# Patient Record
Sex: MALE | Race: BLACK OR AFRICAN AMERICAN | NOT HISPANIC OR LATINO | Employment: FULL TIME | URBAN - METROPOLITAN AREA
[De-identification: names, ages, dates, MRNs, and addresses within clinical notes are randomized per-mention and may not be internally consistent; named-entity substitution may affect disease eponyms.]

---

## 2018-08-23 ENCOUNTER — CLINICAL SUPPORT (OUTPATIENT)
Dept: OCCUPATIONAL MEDICINE | Facility: CLINIC | Age: 38
End: 2018-08-23

## 2018-08-23 DIAGNOSIS — Z02.89 ENCOUNTER FOR PHYSICAL EXAMINATION RELATED TO EMPLOYMENT: Primary | ICD-10-CM

## 2018-08-23 PROCEDURE — 86580 TB INTRADERMAL TEST: CPT | Mod: S$GLB,,, | Performed by: FAMILY MEDICINE

## 2018-08-26 LAB
TB INDURATION - 48 HR READ: 0 MM
TB INDURATION - 72 HR READ: NORMAL MM
TB SKIN TEST - 48 HR READ: NEGATIVE
TB SKIN TEST - 72 HR READ: NORMAL

## 2018-08-27 ENCOUNTER — CLINICAL SUPPORT (OUTPATIENT)
Dept: OCCUPATIONAL MEDICINE | Facility: CLINIC | Age: 38
End: 2018-08-27

## 2018-08-27 DIAGNOSIS — Z23 ENCOUNTER FOR VACCINATION: Primary | ICD-10-CM

## 2018-08-27 PROCEDURE — 90707 MMR VACCINE SC: CPT | Mod: S$GLB,,, | Performed by: FAMILY MEDICINE

## 2018-09-23 ENCOUNTER — HOSPITAL ENCOUNTER (EMERGENCY)
Facility: HOSPITAL | Age: 38
Discharge: HOME | End: 2018-09-23
Attending: INTERNAL MEDICINE

## 2018-09-23 VITALS
RESPIRATION RATE: 17 BRPM | OXYGEN SATURATION: 99 % | WEIGHT: 225 LBS | HEART RATE: 71 BPM | HEIGHT: 70 IN | SYSTOLIC BLOOD PRESSURE: 145 MMHG | TEMPERATURE: 99 F | DIASTOLIC BLOOD PRESSURE: 84 MMHG | BODY MASS INDEX: 32.21 KG/M2

## 2018-09-23 DIAGNOSIS — L03.90 CELLULITIS, UNSPECIFIED CELLULITIS SITE: ICD-10-CM

## 2018-09-23 DIAGNOSIS — H60.11 CELLULITIS OF RIGHT EARLOBE: Primary | ICD-10-CM

## 2018-09-23 PROCEDURE — 99283 EMERGENCY DEPT VISIT LOW MDM: CPT

## 2018-09-23 RX ORDER — DOXYCYCLINE 100 MG/1
100 CAPSULE ORAL 2 TIMES DAILY
Qty: 20 CAPSULE | Refills: 0 | Status: SHIPPED | OUTPATIENT
Start: 2018-09-23 | End: 2018-10-03

## 2018-09-24 NOTE — ED PROVIDER NOTES
Encounter Date: 9/23/2018       History     Chief Complaint   Patient presents with    Otalgia     pt has a red raised lump on the right ear lobe. Noticed a small bump at the beginning of the week and overnight it got bigger.     38-year-old male presents to the emergency department complaining of red bump to right earlobe x1 week.  He denies fever/chills, nausea/vomiting.      The history is provided by the patient. No  was used.     Review of patient's allergies indicates:   Allergen Reactions    Pcn [penicillins] Anaphylaxis     History reviewed. No pertinent past medical history.  History reviewed. No pertinent surgical history.  History reviewed. No pertinent family history.  Social History     Tobacco Use    Smoking status: Current Every Day Smoker     Packs/day: 1.00     Types: Cigarettes   Substance Use Topics    Alcohol use: Yes     Comment: sometimes    Drug use: Yes     Types: Marijuana     Review of Systems   Skin: Positive for color change.   All other systems reviewed and are negative.      Physical Exam     Initial Vitals [09/23/18 1945]   BP Pulse Resp Temp SpO2   (!) 145/84 71 17 98.7 °F (37.1 °C) 99 %      MAP       --         Physical Exam    Nursing note and vitals reviewed.  Constitutional: He appears well-developed and well-nourished. No distress.   HENT:   Head: Normocephalic and atraumatic.   Right Ear: External ear normal.   Left Ear: External ear normal.   Eyes: EOM are normal.   Neck: Normal range of motion.   Cardiovascular: Normal rate and regular rhythm.   Pulmonary/Chest: No respiratory distress.   Abdominal: Soft.   Musculoskeletal: Normal range of motion.   Neurological: He is alert.   Skin: Skin is warm and dry.   Right earlobe with an erythematous, papular, tender lesion without fluctuance   Psychiatric: He has a normal mood and affect.         ED Course   Procedures  Labs Reviewed - No data to display       Imaging Results    None          Medical Decision  Making:   Initial Assessment:   38-year-old male presents to the emergency department complaining of red bump to right earlobe x1 week.  He denies fever/chills, nausea/vomiting.  ED Management:  Patient was given instructions for cellulitis and a prescription for doxycycline.  He was advised to follow up with his primary care physician within the next 2 days for re-evaluation and to return to the emergency department if condition worsens.                      Clinical Impression:   The primary encounter diagnosis was Cellulitis of right earlobe. A diagnosis of Cellulitis, unspecified cellulitis site was also pertinent to this visit.      Disposition:   Disposition: Discharged  Condition: Stable                        Mitch Milan MD  09/23/18 2016

## 2020-02-19 ENCOUNTER — OCCUPATIONAL HEALTH (OUTPATIENT)
Dept: URGENT CARE | Facility: CLINIC | Age: 40
End: 2020-02-19

## 2020-02-19 DIAGNOSIS — Z02.83 ENCOUNTER FOR DRUG SCREENING: Primary | ICD-10-CM

## 2020-02-19 LAB
CTP QC/QA: YES
POC 5 PANEL DRUG SCREEN: NEGATIVE

## 2020-02-19 PROCEDURE — 80305 POCT RAPID DRUG SCREEN 5 PANEL: ICD-10-PCS | Mod: QW,S$GLB,, | Performed by: FAMILY MEDICINE

## 2020-02-19 PROCEDURE — 80305 DRUG TEST PRSMV DIR OPT OBS: CPT | Mod: QW,S$GLB,, | Performed by: FAMILY MEDICINE

## 2020-07-08 ENCOUNTER — OFFICE VISIT (OUTPATIENT)
Dept: URGENT CARE | Facility: CLINIC | Age: 40
End: 2020-07-08
Payer: MEDICAID

## 2020-07-08 VITALS
RESPIRATION RATE: 19 BRPM | OXYGEN SATURATION: 97 % | HEART RATE: 88 BPM | SYSTOLIC BLOOD PRESSURE: 135 MMHG | BODY MASS INDEX: 32.21 KG/M2 | HEIGHT: 70 IN | WEIGHT: 225 LBS | DIASTOLIC BLOOD PRESSURE: 79 MMHG | TEMPERATURE: 99 F

## 2020-07-08 DIAGNOSIS — K04.7 DENTAL ABSCESS: ICD-10-CM

## 2020-07-08 DIAGNOSIS — H60.01 ABSCESS OF RIGHT EARLOBE: Primary | ICD-10-CM

## 2020-07-08 PROCEDURE — 99203 PR OFFICE/OUTPT VISIT, NEW, LEVL III, 30-44 MIN: ICD-10-PCS | Mod: S$GLB,,, | Performed by: PHYSICIAN ASSISTANT

## 2020-07-08 PROCEDURE — 99203 OFFICE O/P NEW LOW 30 MIN: CPT | Mod: S$GLB,,, | Performed by: PHYSICIAN ASSISTANT

## 2020-07-08 RX ORDER — IBUPROFEN 600 MG/1
600 TABLET ORAL EVERY 6 HOURS PRN
Qty: 20 TABLET | Refills: 0 | Status: SHIPPED | OUTPATIENT
Start: 2020-07-08 | End: 2020-08-07

## 2020-07-08 RX ORDER — CLINDAMYCIN HYDROCHLORIDE 300 MG/1
300 CAPSULE ORAL 4 TIMES DAILY
Qty: 40 CAPSULE | Refills: 0 | Status: SHIPPED | OUTPATIENT
Start: 2020-07-08 | End: 2020-07-18

## 2020-07-08 NOTE — PATIENT INSTRUCTIONS
- Rest.  - Drink plenty of fluids.  - Take Tylenol and/or Ibuprofen as directed as needed for fever/pain.  Do not take more than the recommended dose.  - follow up with your PCP within the next 1-2 weeks as needed.  - You must understand that you have received an Urgent Care treatment only and that you may be released before all of your medical problems are known or treated.   - You, the patient, will arrange for follow up care as instructed.   - If your condition worsens or fails to improve we recommend that you receive another evaluation at the ER immediately or contact your PCP to discuss your concerns.   - You can call [phone] or [phone] to help schedule an appointment with the appropriate provider.      Abscess (Antibiotic Treatment Only)  An abscess (sometimes called a boil) happens when bacteria get trapped under the skin and start to grow. Pus forms inside the abscess as the body responds to the bacteria. An abscess can happen with an insect bite, ingrown hair, blocked oil gland, pimple, cyst, or puncture wound.  In the early stages, your wound may be red and tender. For this stage, you may get antibiotics. If the abscess does not get better with antibiotics, it will need to be drained with a small cut.  Home care  These tips will help you care for your abscess at home:  · Soak the wound in hot water or apply hot packs (small towel soaked in hot water) to the area for 20 minutes at a time. Do this 3 to 4 times a day.  · Do not cut, squeeze, or pop the boil yourself.  · Apply antibiotic cream or ointment to the skin 3 to 4 times a day, unless something else was prescribed. Some ointments include an antibiotic plus a pain reliever.  · If your doctor prescribed antibiotics, do not stop taking them until you have finished the medicine or the doctor tells you to stop.  · You may use an over-the-counter pain medicine to control pain, unless another pain medicine was prescribed. If you have chronic  liver or kidney disease or ever had a stomach ulcer or gastrointestinal bleeding, talk with your doctor before using these any of these.  Follow-up care  Follow up with your healthcare provider, or as advised. Check your wound each day for the signs of worsening infection listed below.  When to seek medical advice  Get prompt medical attention if any of these occur:  · An increase in redness or swelling  · Red streaks in the skin leading away from the abscess  · An increase in local pain or swelling  · Fever of 100.4ºF (38ºC) or higher, or as directed by your healthcare provider  · Pus or fluid coming from the abscess  · Boil returns after getting better  Date Last Reviewed: 9/1/2016  © 8549-8097 [employer]. [address] [place] [address], [place] [address]. All rights reserved. This information is not intended as a substitute for professional medical care. Always follow your healthcare professional's instructions.        Dental Abscess    An abscess is a pocket of pus at the tip of a tooth root in your jaw bone. It is caused by an infection at the root of the tooth. It can cause pain and swelling of the gum, cheek, or jaw. Pain may spread from the tooth to your ear or the area of your jaw on the same side. If the abscess isnt treated, it appears as a bubble or swelling on the gum near the tooth. The pressure that builds in this swelling is the source of the pain. More serious infections cause your face to swell.  An abscess can be caused by a crack in the tooth, a cavity, a gum infection, or a combination of these. Once the pulp of the tooth is exposed, bacteria can spread down the roots to the tip. If the bacteria are not stopped, they can damage the bone and soft tissue, and an abscess can form.  Home care  Follow these guidelines when caring for yourself at home:  · Avoid hot and cold foods and drinks. Your tooth may be sensitive to changes in temperature. Dont chew on the side of the infected  "tooth.  · If your tooth is chipped or cracked, or if there is a large open cavity, put oil of cloves directly on the tooth to relieve pain. You can buy oil of cloves at drugstores. Some pharmacies carry an over-the-counter "toothache kit." This contains a paste that you can put on the exposed tooth to make it less sensitive.  · Put a cold pack on your jaw over the sore area to help reduce pain.  · You may use over-the-counter medicine to ease pain, unless another medicine was prescribed. If you have chronic liver or kidney disease, talk with your healthcare provider before using acetaminophen or ibuprofen. Also talk with your provider if youve had a stomach ulcer or GI bleeding.  · An antibiotic will be prescribed. Take it until finished, even if you are feeling better after a few days.  Follow-up care  Follow up with your dentist or an oral surgeon, or as advised. Once an infection occurs in a tooth, it will continue to be a problem until the infection is drained. This is done through surgery or a root canal. Or you may need to have your tooth pulled.  Call 911  Call 911 if any of these occur:  · Unusual drowsiness  · Headache or stiff neck  · Weakness or fainting  · Difficulty swallowing, breathing, or opening your mouth  · Swollen eyelids  When to seek medical advice  Call your healthcare provider right away if any of these occur:  · Your face becomes more swollen or red  · Pain gets worse or spreads to your neck  · Fever of 100.4º F (38.0º C) or higher, or as directed by your healthcare provider  · Pus drains from the tooth  Date Last Reviewed: 10/1/2016  © 0079-0713 [employer]. [address] [place] [address], [place] [address]. All rights reserved. This information is not intended as a substitute for professional medical care. Always follow your healthcare professional's instructions.        "

## 2020-07-08 NOTE — PROGRESS NOTES
"Subjective:       Patient ID: Boaz Pedroza is a 39 y.o. male.    Vitals:  height is 5' 10" (1.778 m) and weight is 102.1 kg (225 lb). His temperature is 98.7 °F (37.1 °C). His blood pressure is 135/79 and his pulse is 88. His respiration is 19 and oxygen saturation is 97%.     Chief Complaint: Abscess and Dental Pain    Pt stated that he have been having dental pain on the left side of his mouth for about a week and a half.  He says he broke the tooth approximately 1 month ago but has not had a chance to see a dentist.  He also as an abscess on his right lower ear lobe that he noticed 3-4 days ago.  Patient states he has had something similar in the past that resolved with antibiotics.  He denies fever or chills.    Abscess  Chronicity:  NewProgression Since Onset: rapidly worsening  Location:  Face  Associated Symptoms: no fever, no chills  Pain Scale:  6/10  Treatments Tried:  Nothing  Relieved by:  Nothing  Worsened by:  Nothing  Dental Pain   This is a new problem. The current episode started 1 to 4 weeks ago. The problem occurs constantly. The problem has been unchanged. The pain is at a severity of 9/10. The pain is severe. Pertinent negatives include no fever. He has tried acetaminophen and NSAIDs for the symptoms. The treatment provided mild relief.       Constitution: Negative for chills, fatigue and fever.   HENT: Positive for dental problem. Negative for congestion and sore throat.    Neck: Negative for painful lymph nodes.   Cardiovascular: Negative for chest pain and leg swelling.   Eyes: Negative for double vision and blurred vision.   Respiratory: Negative for cough and shortness of breath.    Gastrointestinal: Negative for nausea, vomiting and diarrhea.   Genitourinary: Negative for dysuria, frequency and urgency.   Musculoskeletal: Negative for joint pain, joint swelling, muscle cramps and muscle ache.   Skin: Positive for abscess. Negative for color change, pale and rash.   Allergic/Immunologic: " Negative for seasonal allergies.   Neurological: Negative for dizziness, history of vertigo, light-headedness, passing out and headaches.   Hematologic/Lymphatic: Negative for swollen lymph nodes, easy bruising/bleeding and history of blood clots. Does not bruise/bleed easily.   Psychiatric/Behavioral: Negative for nervous/anxious, sleep disturbance and depression. The patient is not nervous/anxious.        Objective:      Physical Exam   Constitutional: He is oriented to person, place, and time. He appears well-developed. He is cooperative.  Non-toxic appearance. He does not appear ill. No distress.   HENT:   Head: Normocephalic and atraumatic.   Right Ear: Hearing, tympanic membrane and ear canal normal. There is swelling.   Left Ear: Hearing, tympanic membrane and ear canal normal.   Ears:    Nose: No mucosal edema, rhinorrhea or nasal deformity. No epistaxis. Right sinus exhibits no maxillary sinus tenderness and no frontal sinus tenderness. Left sinus exhibits no maxillary sinus tenderness and no frontal sinus tenderness.   Mouth/Throat: Uvula is midline and mucous membranes are normal. No trismus in the jaw. Dental abscesses and dental caries present. No uvula swelling. No posterior oropharyngeal edema or posterior oropharyngeal erythema.       Eyes: Conjunctivae and lids are normal. No scleral icterus.   Neck: Trachea normal, full passive range of motion without pain and phonation normal. Neck supple. No neck rigidity. No edema and no erythema present.   Cardiovascular: Normal rate, regular rhythm, normal heart sounds and normal pulses.   Pulmonary/Chest: Effort normal and breath sounds normal. No respiratory distress. He has no decreased breath sounds. He has no rhonchi.   Abdominal: Normal appearance.   Musculoskeletal: Normal range of motion.         General: No tenderness or deformity.   Neurological: He is alert and oriented to person, place, and time. He exhibits normal muscle tone. Coordination normal.    Skin: Skin is warm, dry, intact, not diaphoretic, not pale and no rash.   Psychiatric: His speech is normal and behavior is normal. Judgment and thought content normal.   Nursing note and vitals reviewed.        Assessment:       1. Abscess of right earlobe    2. Dental abscess        Plan:         Abscess of right earlobe  -     clindamycin (CLEOCIN) 300 MG capsule; Take 1 capsule (300 mg total) by mouth 4 (four) times daily. for 10 days  Dispense: 40 capsule; Refill: 0  -     ibuprofen (ADVIL,MOTRIN) 600 MG tablet; Take 1 tablet (600 mg total) by mouth every 6 (six) hours as needed.  Dispense: 20 tablet; Refill: 0    Dental abscess  -     clindamycin (CLEOCIN) 300 MG capsule; Take 1 capsule (300 mg total) by mouth 4 (four) times daily. for 10 days  Dispense: 40 capsule; Refill: 0  -     ibuprofen (ADVIL,MOTRIN) 600 MG tablet; Take 1 tablet (600 mg total) by mouth every 6 (six) hours as needed.  Dispense: 20 tablet; Refill: 0         Boaz was seen today for abscess and dental pain.    Diagnoses and all orders for this visit:    Abscess of right earlobe  -     clindamycin (CLEOCIN) 300 MG capsule; Take 1 capsule (300 mg total) by mouth 4 (four) times daily. for 10 days  -     ibuprofen (ADVIL,MOTRIN) 600 MG tablet; Take 1 tablet (600 mg total) by mouth every 6 (six) hours as needed.    Dental abscess  -     clindamycin (CLEOCIN) 300 MG capsule; Take 1 capsule (300 mg total) by mouth 4 (four) times daily. for 10 days  -     ibuprofen (ADVIL,MOTRIN) 600 MG tablet; Take 1 tablet (600 mg total) by mouth every 6 (six) hours as needed.      Patient Instructions   - Rest.  - Drink plenty of fluids.  - Take Tylenol and/or Ibuprofen as directed as needed for fever/pain.  Do not take more than the recommended dose.  - follow up with your PCP within the next 1-2 weeks as needed.  - You must understand that you have received an Urgent Care treatment only and that you may be released before all of your medical problems are  known or treated.   - You, the patient, will arrange for follow up care as instructed.   - If your condition worsens or fails to improve we recommend that you receive another evaluation at the ER immediately or contact your PCP to discuss your concerns.   - You can call [phone] or [phone] to help schedule an appointment with the appropriate provider.      Abscess (Antibiotic Treatment Only)  An abscess (sometimes called a boil) happens when bacteria get trapped under the skin and start to grow. Pus forms inside the abscess as the body responds to the bacteria. An abscess can happen with an insect bite, ingrown hair, blocked oil gland, pimple, cyst, or puncture wound.  In the early stages, your wound may be red and tender. For this stage, you may get antibiotics. If the abscess does not get better with antibiotics, it will need to be drained with a small cut.  Home care  These tips will help you care for your abscess at home:  · Soak the wound in hot water or apply hot packs (small towel soaked in hot water) to the area for 20 minutes at a time. Do this 3 to 4 times a day.  · Do not cut, squeeze, or pop the boil yourself.  · Apply antibiotic cream or ointment to the skin 3 to 4 times a day, unless something else was prescribed. Some ointments include an antibiotic plus a pain reliever.  · If your doctor prescribed antibiotics, do not stop taking them until you have finished the medicine or the doctor tells you to stop.  · You may use an over-the-counter pain medicine to control pain, unless another pain medicine was prescribed. If you have chronic liver or kidney disease or ever had a stomach ulcer or gastrointestinal bleeding, talk with your doctor before using these any of these.  Follow-up care  Follow up with your healthcare provider, or as advised. Check your wound each day for the signs of worsening infection listed below.  When to seek medical advice  Get prompt medical attention if any of  "these occur:  · An increase in redness or swelling  · Red streaks in the skin leading away from the abscess  · An increase in local pain or swelling  · Fever of 100.4ºF (38ºC) or higher, or as directed by your healthcare provider  · Pus or fluid coming from the abscess  · Boil returns after getting better  Date Last Reviewed: 9/1/2016  © 2789-6392 [employer]. [address] [place] [address], [place] [address]. All rights reserved. This information is not intended as a substitute for professional medical care. Always follow your healthcare professional's instructions.        Dental Abscess    An abscess is a pocket of pus at the tip of a tooth root in your jaw bone. It is caused by an infection at the root of the tooth. It can cause pain and swelling of the gum, cheek, or jaw. Pain may spread from the tooth to your ear or the area of your jaw on the same side. If the abscess isnt treated, it appears as a bubble or swelling on the gum near the tooth. The pressure that builds in this swelling is the source of the pain. More serious infections cause your face to swell.  An abscess can be caused by a crack in the tooth, a cavity, a gum infection, or a combination of these. Once the pulp of the tooth is exposed, bacteria can spread down the roots to the tip. If the bacteria are not stopped, they can damage the bone and soft tissue, and an abscess can form.  Home care  Follow these guidelines when caring for yourself at home:  · Avoid hot and cold foods and drinks. Your tooth may be sensitive to changes in temperature. Dont chew on the side of the infected tooth.  · If your tooth is chipped or cracked, or if there is a large open cavity, put oil of cloves directly on the tooth to relieve pain. You can buy oil of cloves at drugstores. Some pharmacies carry an over-the-counter "toothache kit." This contains a paste that you can put on the exposed tooth to make it less sensitive.  · Put a cold pack on your jaw over " the sore area to help reduce pain.  · You may use over-the-counter medicine to ease pain, unless another medicine was prescribed. If you have chronic liver or kidney disease, talk with your healthcare provider before using acetaminophen or ibuprofen. Also talk with your provider if youve had a stomach ulcer or GI bleeding.  · An antibiotic will be prescribed. Take it until finished, even if you are feeling better after a few days.  Follow-up care  Follow up with your dentist or an oral surgeon, or as advised. Once an infection occurs in a tooth, it will continue to be a problem until the infection is drained. This is done through surgery or a root canal. Or you may need to have your tooth pulled.  Call 911  Call 911 if any of these occur:  · Unusual drowsiness  · Headache or stiff neck  · Weakness or fainting  · Difficulty swallowing, breathing, or opening your mouth  · Swollen eyelids  When to seek medical advice  Call your healthcare provider right away if any of these occur:  · Your face becomes more swollen or red  · Pain gets worse or spreads to your neck  · Fever of 100.4º F (38.0º C) or higher, or as directed by your healthcare provider  · Pus drains from the tooth  Date Last Reviewed: 10/1/2016  © 1531-0621 [employer]. [address] [place] [address], [place] [address]. All rights reserved. This information is not intended as a substitute for professional medical care. Always follow your healthcare professional's instructions.

## 2021-06-04 ENCOUNTER — HOSPITAL ENCOUNTER (EMERGENCY)
Facility: HOSPITAL | Age: 41
Discharge: HOME | End: 2021-06-04
Attending: STUDENT IN AN ORGANIZED HEALTH CARE EDUCATION/TRAINING PROGRAM
Payer: MEDICAID

## 2021-06-04 VITALS
WEIGHT: 250 LBS | BODY MASS INDEX: 35.79 KG/M2 | HEART RATE: 80 BPM | OXYGEN SATURATION: 99 % | DIASTOLIC BLOOD PRESSURE: 80 MMHG | RESPIRATION RATE: 16 BRPM | TEMPERATURE: 99 F | SYSTOLIC BLOOD PRESSURE: 142 MMHG | HEIGHT: 70 IN

## 2021-06-04 DIAGNOSIS — R22.9 SKIN MASS: ICD-10-CM

## 2021-06-04 DIAGNOSIS — L02.91 ABSCESS: Primary | ICD-10-CM

## 2021-06-04 PROCEDURE — 10060 I&D ABSCESS SIMPLE/SINGLE: CPT | Mod: ER

## 2021-06-04 PROCEDURE — 99284 EMERGENCY DEPT VISIT MOD MDM: CPT | Mod: 25,ER

## 2021-06-04 PROCEDURE — 25000003 PHARM REV CODE 250: Mod: ER | Performed by: NURSE PRACTITIONER

## 2021-06-04 RX ORDER — HYDROCODONE BITARTRATE AND ACETAMINOPHEN 5; 325 MG/1; MG/1
1 TABLET ORAL
Status: COMPLETED | OUTPATIENT
Start: 2021-06-04 | End: 2021-06-04

## 2021-06-04 RX ORDER — SULFAMETHOXAZOLE AND TRIMETHOPRIM 800; 160 MG/1; MG/1
1 TABLET ORAL 2 TIMES DAILY
Qty: 14 TABLET | Refills: 0 | Status: ON HOLD | OUTPATIENT
Start: 2021-06-04 | End: 2024-01-28 | Stop reason: HOSPADM

## 2021-06-04 RX ORDER — SULFAMETHOXAZOLE AND TRIMETHOPRIM 800; 160 MG/1; MG/1
1 TABLET ORAL
Status: COMPLETED | OUTPATIENT
Start: 2021-06-04 | End: 2021-06-04

## 2021-06-04 RX ORDER — LIDOCAINE HYDROCHLORIDE 10 MG/ML
5 INJECTION INFILTRATION; PERINEURAL
Status: COMPLETED | OUTPATIENT
Start: 2021-06-04 | End: 2021-06-04

## 2021-06-04 RX ORDER — HYDROCODONE BITARTRATE AND ACETAMINOPHEN 5; 325 MG/1; MG/1
1 TABLET ORAL EVERY 6 HOURS PRN
Qty: 12 TABLET | Refills: 0 | Status: ON HOLD | OUTPATIENT
Start: 2021-06-04 | End: 2024-01-28 | Stop reason: HOSPADM

## 2021-06-04 RX ORDER — MUPIROCIN 20 MG/G
OINTMENT TOPICAL 3 TIMES DAILY
Qty: 15 G | Refills: 0 | Status: SHIPPED | OUTPATIENT
Start: 2021-06-04

## 2021-06-04 RX ADMIN — SULFAMETHOXAZOLE AND TRIMETHOPRIM 1 TABLET: 800; 160 TABLET ORAL at 05:06

## 2021-06-04 RX ADMIN — HYDROCODONE BITARTRATE AND ACETAMINOPHEN 1 TABLET: 5; 325 TABLET ORAL at 05:06

## 2021-06-04 RX ADMIN — LIDOCAINE HYDROCHLORIDE 5 ML: 10 INJECTION, SOLUTION INFILTRATION; PERINEURAL at 05:06

## 2022-02-17 ENCOUNTER — OFFICE VISIT (OUTPATIENT)
Dept: URGENT CARE | Facility: CLINIC | Age: 42
End: 2022-02-17
Payer: MEDICAID

## 2022-02-17 VITALS
WEIGHT: 250 LBS | HEIGHT: 70 IN | OXYGEN SATURATION: 97 % | RESPIRATION RATE: 18 BRPM | SYSTOLIC BLOOD PRESSURE: 152 MMHG | HEART RATE: 87 BPM | DIASTOLIC BLOOD PRESSURE: 74 MMHG | BODY MASS INDEX: 35.79 KG/M2 | TEMPERATURE: 98 F

## 2022-02-17 DIAGNOSIS — K04.7 DENTAL ABSCESS: Primary | ICD-10-CM

## 2022-02-17 PROCEDURE — 1159F MED LIST DOCD IN RCRD: CPT | Mod: CPTII,S$GLB,, | Performed by: PHYSICIAN ASSISTANT

## 2022-02-17 PROCEDURE — 3078F PR MOST RECENT DIASTOLIC BLOOD PRESSURE < 80 MM HG: ICD-10-PCS | Mod: CPTII,S$GLB,, | Performed by: PHYSICIAN ASSISTANT

## 2022-02-17 PROCEDURE — 3078F DIAST BP <80 MM HG: CPT | Mod: CPTII,S$GLB,, | Performed by: PHYSICIAN ASSISTANT

## 2022-02-17 PROCEDURE — 3077F SYST BP >= 140 MM HG: CPT | Mod: CPTII,S$GLB,, | Performed by: PHYSICIAN ASSISTANT

## 2022-02-17 PROCEDURE — 1160F RVW MEDS BY RX/DR IN RCRD: CPT | Mod: CPTII,S$GLB,, | Performed by: PHYSICIAN ASSISTANT

## 2022-02-17 PROCEDURE — 3008F PR BODY MASS INDEX (BMI) DOCUMENTED: ICD-10-PCS | Mod: CPTII,S$GLB,, | Performed by: PHYSICIAN ASSISTANT

## 2022-02-17 PROCEDURE — 99213 PR OFFICE/OUTPT VISIT, EST, LEVL III, 20-29 MIN: ICD-10-PCS | Mod: S$GLB,,, | Performed by: PHYSICIAN ASSISTANT

## 2022-02-17 PROCEDURE — 3077F PR MOST RECENT SYSTOLIC BLOOD PRESSURE >= 140 MM HG: ICD-10-PCS | Mod: CPTII,S$GLB,, | Performed by: PHYSICIAN ASSISTANT

## 2022-02-17 PROCEDURE — 1159F PR MEDICATION LIST DOCUMENTED IN MEDICAL RECORD: ICD-10-PCS | Mod: CPTII,S$GLB,, | Performed by: PHYSICIAN ASSISTANT

## 2022-02-17 PROCEDURE — 99213 OFFICE O/P EST LOW 20 MIN: CPT | Mod: S$GLB,,, | Performed by: PHYSICIAN ASSISTANT

## 2022-02-17 PROCEDURE — 3008F BODY MASS INDEX DOCD: CPT | Mod: CPTII,S$GLB,, | Performed by: PHYSICIAN ASSISTANT

## 2022-02-17 PROCEDURE — 1160F PR REVIEW ALL MEDS BY PRESCRIBER/CLIN PHARMACIST DOCUMENTED: ICD-10-PCS | Mod: CPTII,S$GLB,, | Performed by: PHYSICIAN ASSISTANT

## 2022-02-17 RX ORDER — NAPROXEN 500 MG/1
500 TABLET ORAL 2 TIMES DAILY WITH MEALS
Qty: 30 TABLET | Refills: 0 | Status: SHIPPED | OUTPATIENT
Start: 2022-02-17 | End: 2023-02-17

## 2022-02-17 RX ORDER — CLINDAMYCIN HYDROCHLORIDE 300 MG/1
300 CAPSULE ORAL 4 TIMES DAILY
Qty: 40 CAPSULE | Refills: 0 | Status: SHIPPED | OUTPATIENT
Start: 2022-02-17 | End: 2022-02-27

## 2022-02-17 NOTE — LETTER
February 17, 2022      [facility]  [address] [facility], [address]  [place] [address]  Phone: [phone]  Fax: [phone]       Patient: Boaz Pedroza   YOB: 1980  Date of Visit: 02/17/2022    To Whom It May Concern:    Rahat Pedroza  was at [facility] on 02/17/2022. The patient may return to work/school on 02/18/2022 with no restrictions. If you have any questions or concerns, or if I can be of further assistance, please do not hesitate to contact me.    Sincerely,         China Garber PA-C

## 2022-02-17 NOTE — PATIENT INSTRUCTIONS
- Rest.  - Drink plenty of fluids.  - Take Tylenol as directed as needed for fever/pain.  Do not take more than the recommended dose.  - follow up with your PCP within the next 1-2 weeks as needed.  - You must understand that you have received an Urgent Care treatment only and that you may be released before all of your medical problems are known or treated.   - You, the patient, will arrange for follow up care as instructed.   - If your condition worsens or fails to improve we recommend that you receive another evaluation at the ER immediately or contact your PCP to discuss your concerns.   - You can call [phone] or [phone] to help schedule an appointment with the appropriate provider.    Patient Education       Tooth Abscess Discharge Instructions   About this topic   A tooth abscess is a collection of pus from an infection. You may have had an injury to a tooth, unhealthy gums, or tooth decay that led to your abscess. You may need a root canal or to have your tooth pulled because of an abscess. You will need antibiotics to treat the infection. It is important to take all of your antibiotics even if you start to feel better. A tooth abscess can become very serious if it is not fully treated.     What care is needed at home?   · Ask your dentist what you need to do when you go home. Make sure you ask questions if you do not understand what the dentist says.  · Avoid very cold or very hot food and drinks. These can make pain worse.  · If you smoke, try to quit. Your doctor or nurse can help.  · You may want to take medicines like ibuprofen, naproxen, or acetaminophen to help with pain.  · Brush your teeth at least 2 times a day. Use toothpaste with fluoride.  · Use dental floss to clean between your teeth every day.  What follow-up care is needed?   · Your dentist may ask you to make visits to the office to check your progress. Be sure to keep these visits. You may need more treatment.  · You may need  to go see other experts if you have gum disease, need to have a root canal, or a tooth needs to be pulled.  What drugs may be needed?   Your dentist may order drugs to:  · Help with pain  · Prevent or fight an infection  Will physical activity be limited?   You may have to limit your activity for a short time if you have pain from the tooth abscess. Talk to your dentist about the right amount of activity for you.  What problems could happen?   · Infection may spread to other parts of your body  · Tooth loss  · Problems eating  · Bad breath  · Swelling  · Problem swallowing or breathing  · Pain  What can be done to prevent this health problem?   · See your dentist at least 2 times a year for a cleaning and check ups.  · Wear a mouth guard or headgear when playing sports.  When do I need to call the doctor?   · You have a fever of 100.4°F (38°C) or higher or chills.  · You have swelling or pain in your neck or throat.  · You are not able to open your mouth or eat.  · You have trouble breathing.  · You have very bad pain that is not helped by pain medicines.  · You have swelling in your gums or face.  · You have discharge around the tooth.  · You have a bad taste in your mouth.  · You have lots of bleeding from the gums.  · You have more pain after having a tooth pulled.  Teach Back: Helping You Understand   The Teach Back Method helps you understand the information we are giving you. After you talk with the staff, tell them in your own words what you learned. This helps to make sure the staff has described each thing clearly. It also helps to explain things that may have been confusing. Before going home, make sure you can do these:  · I can tell you about my condition.  · I can tell you how I should rinse my mouth.  · I can tell you what I will do if I have more tooth pain or swelling.  Where can I learn more?   [facility] Choices  http://www.nhs.[place]/Conditions/Dental-abscess/Pages/Introduction.aspx   Last Reviewed Date    2021-06-22  Consumer Information Use and Disclaimer   This information is not specific medical advice and does not replace information you receive from your health care provider. This is only a brief summary of general information. It does NOT include all information about conditions, illnesses, injuries, tests, procedures, treatments, therapies, discharge instructions or life-style choices that may apply to you. You must talk with your health care provider for complete information about your health and treatment options. This information should not be used to decide whether or not to accept your health care providers advice, instructions or recommendations. Only your health care provider has the knowledge and training to provide advice that is right for you.  Copyright   Copyright © 2021 [employer] and its affiliates and/or licensors. All rights reserved.  Patient Education       Dental Pain Discharge Instructions   About this topic   Tooth pain happens when the nerve in a tooth or the gum tissue around a tooth is irritated. You may have an intense, sharp pain or a mild, dull pain. The pain may be all the time or come and go. Sometimes, you may not know which tooth is causing your pain. You may think the pain is coming from more than one tooth. A fractured tooth, tooth decay, or gum disease are most often the cause of tooth pain. Tooth problems can cause pain in other areas of the head and neck. At times, tooth problems may also cause ear and jaw pain. Pain from a sinus infection may also feel like tooth pain.         What care is needed at home?   · Ask your dentist what you need to do when you go home. Make sure you ask questions if you do not understand what the dentist says. This way you will know what you need to do.  · Avoid very cold or very hot food or drinks. These may make pain worse.  What follow-up care is needed?   · Your dentist may ask you to make visits to the office to check on your  progress. Be sure to keep these visits.  · Your dentist may send you to an endodontist if you need a root canal.  · Your dentist may send you to a periodontist if you have gum disease.  · Your dentist may send you to an oral surgeon if you need a tooth or teeth removed.  What drugs may be needed?   The dentist may order drugs to:  · Help with pain and swelling  · Prevent or fight an infection  Will physical activity be limited?   Based on the cause of your tooth pain, you may have to limit your activity for a short time. Ask your dentist if you need to make changes to your daily routine.  What problems could happen?   If dental pain is not treated you may have:  · Infection that spreads to other parts of the body  · Gum disease that spreads to other parts of the mouth or body  · Tooth decay that gets worse and causes a loss of the tooth or nearby teeth  · Pain that gets worse or more intense  · Problems eating due to infection or pain  What can be done to prevent this health problem?   · Brush your teeth at least 2 times a day. Use toothpaste with fluoride.  · Use dental floss to clean between your teeth every day.  · Eat a well-balanced diet.  · Try to stay away from foods and drinks that are high in acid, sugar, and starch. These are things like chocolate, sweets, cakes, and fizzy drinks.  · See your dentist for regular cleaning and checkups.  · Wear a mouth guard or headgear when playing sports.  · If you are a smoker, stop smoking. Smoking can make some dental problems worse.  When do I need to call the doctor?   · Signs of infection. These include fever of 100.4° F (38°C) or higher; chills; swelling of the gums, neck, or face; discharge around a tooth; or bad taste in your mouth.  · Bad toothache or your mouth hurts a lot  · Pain that keeps you from sleeping  · Trouble swallowing, breathing, or chewing  · Trouble opening your mouth all the way  · Jaw pain with ear, chest, shoulder, or arm pain  · Lots of  bleeding from the gums  · Increased pain after a tooth is pulled. This may be a dry socket.  · Your condition gets worse or you have other concerns  Teach Back: Helping You Understand   The Teach Back Method helps you understand the information we are giving you. After you talk with the staff, tell them in your own words what you learned. This helps to make sure the staff has described each thing clearly. It also helps to explain things that may have been confusing. Before going home, make sure you can do these:  · I can tell you about my pain.  · I can tell you what may help ease my pain.  · I can tell you what I will do if I have trouble breathing, eating, or sleeping because of the pain.  Where can I learn more?   [facility] Choices  https://www.nhs.[place]/conditions/dental-abscess/   [facility] Choices  https://www.nhs.[place]/conditions/toothache/   Last Reviewed Date   2021-09-10  Consumer Information Use and Disclaimer   This information is not specific medical advice and does not replace information you receive from your health care provider. This is only a brief summary of general information. It does NOT include all information about conditions, illnesses, injuries, tests, procedures, treatments, therapies, discharge instructions or life-style choices that may apply to you. You must talk with your health care provider for complete information about your health and treatment options. This information should not be used to decide whether or not to accept your health care providers advice, instructions or recommendations. Only your health care provider has the knowledge and training to provide advice that is right for you.  Copyright   Copyright © 2021 [employer] and its affiliates and/or licensors. All rights reserved.

## 2022-02-17 NOTE — PROGRESS NOTES
"Subjective:       Patient ID: Boaz Pedroza is a 41 y.o. male.    Vitals:  height is 5' 10" (1.778 m) and weight is 113.4 kg (250 lb). His temperature is 97.6 °F (36.4 °C). His blood pressure is 152/74 (abnormal) and his pulse is 87. His respiration is 18 and oxygen saturation is 97%.     Chief Complaint: Dental Pain    Pt stated that he has had a tooth ache since yesterday.  Tooth is located on right upper jaw.    He says he thinks he has a hole in the tooth and that is cracked.  Pt stated that he thinks it may be infected and would like antibiotice . Pts pharmacy has been updated .     Dental Pain   This is a new problem. The current episode started yesterday. The problem occurs constantly. The problem has been unchanged. The pain is at a severity of 8/10. The pain is severe. Associated symptoms include facial pain. Pertinent negatives include no difficulty swallowing, fever, oral bleeding, sinus pressure or thermal sensitivity. He has tried NSAIDs for the symptoms. The treatment provided no relief.       Constitution: Negative for chills and fever.   HENT: Positive for dental problem. Negative for congestion and sinus pressure.    Cardiovascular: Negative for chest pain.   Respiratory: Negative for shortness of breath.    Musculoskeletal: Negative for pain and trauma.   Skin: Negative for color change.   Neurological: Negative for headaches and loss of consciousness.   Psychiatric/Behavioral: Negative for nervous/anxious. The patient is not nervous/anxious.        Objective:      Physical Exam   Constitutional: He is oriented to person, place, and time. He appears well-developed. He is cooperative.  Non-toxic appearance. He does not appear ill. No distress.   HENT:   Head: Normocephalic and atraumatic.   Ears:   Right Ear: Hearing normal.   Left Ear: Hearing normal.   Mouth/Throat: Dental abscesses and dental caries present.       Eyes: Conjunctivae and lids are normal. No scleral icterus.   Neck: Trachea normal " and phonation normal. Neck supple. No edema present. No erythema present. No neck rigidity present.   Cardiovascular: Normal rate, regular rhythm, normal heart sounds and normal pulses.   Pulmonary/Chest: Effort normal and breath sounds normal. No respiratory distress. He has no decreased breath sounds. He has no rhonchi.   Abdominal: Normal appearance.   Musculoskeletal: Normal range of motion.         General: No tenderness or deformity. Normal range of motion.   Neurological: He is alert and oriented to person, place, and time. He exhibits normal muscle tone. Coordination normal.   Skin: Skin is warm, dry, intact, not diaphoretic, not pale and no rash.   Psychiatric: His speech is normal and behavior is normal. Judgment and thought content normal.   Nursing note and vitals reviewed.        Assessment:       1. Dental abscess          Plan:         Dental abscess  -     clindamycin (CLEOCIN) 300 MG capsule; Take 1 capsule (300 mg total) by mouth 4 (four) times daily. for 10 days  Dispense: 40 capsule; Refill: 0  -     naproxen (NAPROSYN) 500 MG tablet; Take 1 tablet (500 mg total) by mouth 2 (two) times daily with meals.  Dispense: 30 tablet; Refill: 0                   Patient Instructions   - Rest.  - Drink plenty of fluids.  - Take Tylenol as directed as needed for fever/pain.  Do not take more than the recommended dose.  - follow up with your PCP within the next 1-2 weeks as needed.  - You must understand that you have received an Urgent Care treatment only and that you may be released before all of your medical problems are known or treated.   - You, the patient, will arrange for follow up care as instructed.   - If your condition worsens or fails to improve we recommend that you receive another evaluation at the ER immediately or contact your PCP to discuss your concerns.   - You can call [phone] or [phone] to help schedule an appointment with the appropriate provider.    Patient Education        Tooth Abscess Discharge Instructions   About this topic   A tooth abscess is a collection of pus from an infection. You may have had an injury to a tooth, unhealthy gums, or tooth decay that led to your abscess. You may need a root canal or to have your tooth pulled because of an abscess. You will need antibiotics to treat the infection. It is important to take all of your antibiotics even if you start to feel better. A tooth abscess can become very serious if it is not fully treated.     What care is needed at home?   · Ask your dentist what you need to do when you go home. Make sure you ask questions if you do not understand what the dentist says.  · Avoid very cold or very hot food and drinks. These can make pain worse.  · If you smoke, try to quit. Your doctor or nurse can help.  · You may want to take medicines like ibuprofen, naproxen, or acetaminophen to help with pain.  · Brush your teeth at least 2 times a day. Use toothpaste with fluoride.  · Use dental floss to clean between your teeth every day.  What follow-up care is needed?   · Your dentist may ask you to make visits to the office to check your progress. Be sure to keep these visits. You may need more treatment.  · You may need to go see other experts if you have gum disease, need to have a root canal, or a tooth needs to be pulled.  What drugs may be needed?   Your dentist may order drugs to:  · Help with pain  · Prevent or fight an infection  Will physical activity be limited?   You may have to limit your activity for a short time if you have pain from the tooth abscess. Talk to your dentist about the right amount of activity for you.  What problems could happen?   · Infection may spread to other parts of your body  · Tooth loss  · Problems eating  · Bad breath  · Swelling  · Problem swallowing or breathing  · Pain  What can be done to prevent this health problem?   · See your dentist at least 2 times a year for a cleaning and check ups.  · Wear a  mouth guard or headgear when playing sports.  When do I need to call the doctor?   · You have a fever of 100.4°F (38°C) or higher or chills.  · You have swelling or pain in your neck or throat.  · You are not able to open your mouth or eat.  · You have trouble breathing.  · You have very bad pain that is not helped by pain medicines.  · You have swelling in your gums or face.  · You have discharge around the tooth.  · You have a bad taste in your mouth.  · You have lots of bleeding from the gums.  · You have more pain after having a tooth pulled.  Teach Back: Helping You Understand   The Teach Back Method helps you understand the information we are giving you. After you talk with the staff, tell them in your own words what you learned. This helps to make sure the staff has described each thing clearly. It also helps to explain things that may have been confusing. Before going home, make sure you can do these:  · I can tell you about my condition.  · I can tell you how I should rinse my mouth.  · I can tell you what I will do if I have more tooth pain or swelling.  Where can I learn more?   [facility] Choices  http://www.nhs.[facility]/Conditions/Dental-abscess/Pages/Introduction.aspx   Last Reviewed Date   2021-06-22  Consumer Information Use and Disclaimer   This information is not specific medical advice and does not replace information you receive from your health care provider. This is only a brief summary of general information. It does NOT include all information about conditions, illnesses, injuries, tests, procedures, treatments, therapies, discharge instructions or life-style choices that may apply to you. You must talk with your health care provider for complete information about your health and treatment options. This information should not be used to decide whether or not to accept your health care providers advice, instructions or recommendations. Only your health care provider has the knowledge and training to  provide advice that is right for you.  Copyright   Copyright © 2021 [employer] and its affiliates and/or licensors. All rights reserved.  Patient Education       Dental Pain Discharge Instructions   About this topic   Tooth pain happens when the nerve in a tooth or the gum tissue around a tooth is irritated. You may have an intense, sharp pain or a mild, dull pain. The pain may be all the time or come and go. Sometimes, you may not know which tooth is causing your pain. You may think the pain is coming from more than one tooth. A fractured tooth, tooth decay, or gum disease are most often the cause of tooth pain. Tooth problems can cause pain in other areas of the head and neck. At times, tooth problems may also cause ear and jaw pain. Pain from a sinus infection may also feel like tooth pain.         What care is needed at home?   · Ask your dentist what you need to do when you go home. Make sure you ask questions if you do not understand what the dentist says. This way you will know what you need to do.  · Avoid very cold or very hot food or drinks. These may make pain worse.  What follow-up care is needed?   · Your dentist may ask you to make visits to the office to check on your progress. Be sure to keep these visits.  · Your dentist may send you to an endodontist if you need a root canal.  · Your dentist may send you to a periodontist if you have gum disease.  · Your dentist may send you to an oral surgeon if you need a tooth or teeth removed.  What drugs may be needed?   The dentist may order drugs to:  · Help with pain and swelling  · Prevent or fight an infection  Will physical activity be limited?   Based on the cause of your tooth pain, you may have to limit your activity for a short time. Ask your dentist if you need to make changes to your daily routine.  What problems could happen?   If dental pain is not treated you may have:  · Infection that spreads to other parts of the body  · Gum disease that  spreads to other parts of the mouth or body  · Tooth decay that gets worse and causes a loss of the tooth or nearby teeth  · Pain that gets worse or more intense  · Problems eating due to infection or pain  What can be done to prevent this health problem?   · Brush your teeth at least 2 times a day. Use toothpaste with fluoride.  · Use dental floss to clean between your teeth every day.  · Eat a well-balanced diet.  · Try to stay away from foods and drinks that are high in acid, sugar, and starch. These are things like chocolate, sweets, cakes, and fizzy drinks.  · See your dentist for regular cleaning and checkups.  · Wear a mouth guard or headgear when playing sports.  · If you are a smoker, stop smoking. Smoking can make some dental problems worse.  When do I need to call the doctor?   · Signs of infection. These include fever of 100.4° F (38°C) or higher; chills; swelling of the gums, neck, or face; discharge around a tooth; or bad taste in your mouth.  · Bad toothache or your mouth hurts a lot  · Pain that keeps you from sleeping  · Trouble swallowing, breathing, or chewing  · Trouble opening your mouth all the way  · Jaw pain with ear, chest, shoulder, or arm pain  · Lots of bleeding from the gums  · Increased pain after a tooth is pulled. This may be a dry socket.  · Your condition gets worse or you have other concerns  Teach Back: Helping You Understand   The Teach Back Method helps you understand the information we are giving you. After you talk with the staff, tell them in your own words what you learned. This helps to make sure the staff has described each thing clearly. It also helps to explain things that may have been confusing. Before going home, make sure you can do these:  · I can tell you about my pain.  · I can tell you what may help ease my pain.  · I can tell you what I will do if I have trouble breathing, eating, or sleeping because of the pain.  Where can I learn more?   [facility]  Choices  https://www.nhs.[place]/conditions/dental-abscess/   [facility] Choices  https://www.nhs.uk/conditions/toothache/   Last Reviewed Date   2021-09-10  Consumer Information Use and Disclaimer   This information is not specific medical advice and does not replace information you receive from your health care provider. This is only a brief summary of general information. It does NOT include all information about conditions, illnesses, injuries, tests, procedures, treatments, therapies, discharge instructions or life-style choices that may apply to you. You must talk with your health care provider for complete information about your health and treatment options. This information should not be used to decide whether or not to accept your health care providers advice, instructions or recommendations. Only your health care provider has the knowledge and training to provide advice that is right for you.  Copyright   Copyright © 2021 [employer] and its affiliates and/or licensors. All rights reserved.

## 2024-01-27 ENCOUNTER — HOSPITAL ENCOUNTER (OUTPATIENT)
Facility: HOSPITAL | Age: 44
Discharge: HOME | End: 2024-01-28
Attending: EMERGENCY MEDICINE | Admitting: HOSPITALIST

## 2024-01-27 DIAGNOSIS — E11.9 NEW ONSET TYPE 2 DIABETES MELLITUS: ICD-10-CM

## 2024-01-27 DIAGNOSIS — E11.10 DKA (DIABETIC KETOACIDOSES): ICD-10-CM

## 2024-01-27 DIAGNOSIS — E11.10 DKA (DIABETIC KETOACIDOSIS): ICD-10-CM

## 2024-01-27 DIAGNOSIS — E11.65 HYPERGLYCEMIA DUE TO DIABETES MELLITUS: Primary | ICD-10-CM

## 2024-01-27 DIAGNOSIS — R07.9 CHEST PAIN: ICD-10-CM

## 2024-01-27 DIAGNOSIS — I10 HYPERTENSION, UNSPECIFIED TYPE: ICD-10-CM

## 2024-01-27 PROBLEM — R03.0 ELEVATED BLOOD PRESSURE READING WITHOUT DIAGNOSIS OF HYPERTENSION: Status: ACTIVE | Noted: 2024-01-27

## 2024-01-27 PROBLEM — R73.9 HYPERGLYCEMIA: Status: ACTIVE | Noted: 2024-01-27

## 2024-01-27 LAB
ALBUMIN SERPL-MCNC: 4 G/DL (ref 3.3–5.5)
ALBUMIN SERPL-MCNC: 4.6 G/DL (ref 3.3–5.5)
ALLENS TEST: ABNORMAL
ALLENS TEST: ABNORMAL
ALP SERPL-CCNC: 67 U/L (ref 42–141)
ALP SERPL-CCNC: 86 U/L (ref 42–141)
BILIRUB SERPL-MCNC: 0.9 MG/DL (ref 0.2–1.6)
BILIRUB SERPL-MCNC: 0.9 MG/DL (ref 0.2–1.6)
BILIRUBIN, POC UA: NEGATIVE
BLOOD, POC UA: ABNORMAL
BUN SERPL-MCNC: 14 MG/DL (ref 7–22)
BUN SERPL-MCNC: 15 MG/DL (ref 7–22)
CALCIUM SERPL-MCNC: 11.1 MG/DL (ref 8–10.3)
CALCIUM SERPL-MCNC: 9.4 MG/DL (ref 8–10.3)
CHLORIDE SERPL-SCNC: 105 MMOL/L (ref 98–108)
CHLORIDE SERPL-SCNC: 99 MMOL/L (ref 98–108)
CLARITY, POC UA: CLEAR
COLOR, POC UA: YELLOW
CREAT SERPL-MCNC: 1 MG/DL (ref 0.6–1.2)
CREAT SERPL-MCNC: 1.3 MG/DL (ref 0.6–1.2)
GLUCOSE SERPL-MCNC: 327 MG/DL (ref 73–118)
GLUCOSE SERPL-MCNC: 462 MG/DL (ref 73–118)
GLUCOSE, POC UA: ABNORMAL
HCO3 UR-SCNC: 22.4 MMOL/L (ref 24–28)
HCO3 UR-SCNC: 27.2 MMOL/L (ref 24–28)
HCT, POC: NORMAL
HGB, POC: NORMAL (ref 14–18)
KETONES, POC UA: ABNORMAL
LDH SERPL L TO P-CCNC: 1.37 MMOL/L (ref 0.5–2.2)
LDH SERPL L TO P-CCNC: 2.44 MMOL/L (ref 0.5–2.2)
LEUKOCYTE EST, POC UA: NEGATIVE
MCH, POC: NORMAL
MCHC, POC: NORMAL
MCV, POC: NORMAL
MPV, POC: NORMAL
NITRITE, POC UA: NEGATIVE
PCO2 BLDA: 39.1 MMHG (ref 35–45)
PCO2 BLDA: 48.9 MMHG (ref 35–45)
PH SMN: 7.35 [PH] (ref 7.35–7.45)
PH SMN: 7.37 [PH] (ref 7.35–7.45)
PH UR STRIP: 5.5 [PH]
PO2 BLDA: 30 MMHG (ref 40–60)
PO2 BLDA: 56 MMHG (ref 40–60)
POC ALT (SGPT): 25 U/L (ref 10–47)
POC ALT (SGPT): 29 U/L (ref 10–47)
POC AST (SGOT): 19 U/L (ref 11–38)
POC AST (SGOT): 23 U/L (ref 11–38)
POC BE: -3 MMOL/L
POC BE: 1 MMOL/L
POC PLATELET COUNT: NORMAL
POC SATURATED O2: 53 % (ref 95–100)
POC SATURATED O2: 88 % (ref 95–100)
POC TCO2: 24 MMOL/L (ref 24–29)
POC TCO2: 25 MMOL/L (ref 18–33)
POC TCO2: 29 MMOL/L (ref 18–33)
POC TCO2: 29 MMOL/L (ref 24–29)
POCT GLUCOSE: 300 MG/DL (ref 70–110)
POCT GLUCOSE: 311 MG/DL (ref 70–110)
POCT GLUCOSE: 338 MG/DL (ref 70–110)
POCT GLUCOSE: 367 MG/DL (ref 70–110)
POCT GLUCOSE: 398 MG/DL (ref 70–110)
POCT GLUCOSE: 480 MG/DL (ref 70–110)
POTASSIUM BLD-SCNC: 4 MMOL/L (ref 3.6–5.1)
POTASSIUM BLD-SCNC: 4.4 MMOL/L (ref 3.6–5.1)
PROTEIN, POC UA: ABNORMAL
PROTEIN, POC: 7.4 G/DL (ref 6.4–8.1)
PROTEIN, POC: 8.6 G/DL (ref 6.4–8.1)
RBC, POC: NORMAL
RDW, POC: NORMAL
SAMPLE: ABNORMAL
SAMPLE: ABNORMAL
SITE: ABNORMAL
SITE: ABNORMAL
SODIUM BLD-SCNC: 140 MMOL/L (ref 128–145)
SODIUM BLD-SCNC: 140 MMOL/L (ref 128–145)
SPECIFIC GRAVITY, POC UA: 1.01
UROBILINOGEN, POC UA: 0.2 E.U./DL
WBC, POC: NORMAL

## 2024-01-27 PROCEDURE — G0378 HOSPITAL OBSERVATION PER HR: HCPCS

## 2024-01-27 PROCEDURE — 63600175 PHARM REV CODE 636 W HCPCS: Mod: ER | Performed by: EMERGENCY MEDICINE

## 2024-01-27 PROCEDURE — 96375 TX/PRO/DX INJ NEW DRUG ADDON: CPT | Mod: ER

## 2024-01-27 PROCEDURE — 96372 THER/PROPH/DIAG INJ SC/IM: CPT | Performed by: PHYSICIAN ASSISTANT

## 2024-01-27 PROCEDURE — 99291 CRITICAL CARE FIRST HOUR: CPT | Mod: ER

## 2024-01-27 PROCEDURE — 96372 THER/PROPH/DIAG INJ SC/IM: CPT | Mod: 59 | Performed by: PHYSICIAN ASSISTANT

## 2024-01-27 PROCEDURE — 83036 HEMOGLOBIN GLYCOSYLATED A1C: CPT | Performed by: EMERGENCY MEDICINE

## 2024-01-27 PROCEDURE — 96374 THER/PROPH/DIAG INJ IV PUSH: CPT | Mod: ER

## 2024-01-27 PROCEDURE — G0378 HOSPITAL OBSERVATION PER HR: HCPCS | Mod: ER

## 2024-01-27 PROCEDURE — 93010 ELECTROCARDIOGRAM REPORT: CPT | Mod: ,,, | Performed by: INTERNAL MEDICINE

## 2024-01-27 PROCEDURE — 96361 HYDRATE IV INFUSION ADD-ON: CPT | Mod: ER

## 2024-01-27 PROCEDURE — 93005 ELECTROCARDIOGRAM TRACING: CPT | Mod: ER

## 2024-01-27 PROCEDURE — 80053 COMPREHEN METABOLIC PANEL: CPT | Mod: ER

## 2024-01-27 PROCEDURE — 82962 GLUCOSE BLOOD TEST: CPT | Mod: ER

## 2024-01-27 PROCEDURE — 82803 BLOOD GASES ANY COMBINATION: CPT | Mod: ER

## 2024-01-27 PROCEDURE — 81003 URINALYSIS AUTO W/O SCOPE: CPT | Mod: ER

## 2024-01-27 PROCEDURE — 63600175 PHARM REV CODE 636 W HCPCS: Performed by: PHYSICIAN ASSISTANT

## 2024-01-27 PROCEDURE — 25000003 PHARM REV CODE 250: Mod: ER | Performed by: PHYSICIAN ASSISTANT

## 2024-01-27 PROCEDURE — 25000003 PHARM REV CODE 250: Performed by: PHYSICIAN ASSISTANT

## 2024-01-27 PROCEDURE — 25000003 PHARM REV CODE 250: Mod: ER | Performed by: EMERGENCY MEDICINE

## 2024-01-27 PROCEDURE — 25000003 PHARM REV CODE 250: Mod: ER | Performed by: NURSE PRACTITIONER

## 2024-01-27 PROCEDURE — 85025 COMPLETE CBC W/AUTO DIFF WBC: CPT | Mod: ER

## 2024-01-27 RX ORDER — IBUPROFEN 200 MG
16 TABLET ORAL
Status: DISCONTINUED | OUTPATIENT
Start: 2024-01-27 | End: 2024-01-28 | Stop reason: HOSPADM

## 2024-01-27 RX ORDER — NALOXONE HCL 0.4 MG/ML
0.02 VIAL (ML) INJECTION
Status: DISCONTINUED | OUTPATIENT
Start: 2024-01-27 | End: 2024-01-28 | Stop reason: HOSPADM

## 2024-01-27 RX ORDER — HYDRALAZINE HYDROCHLORIDE 25 MG/1
25 TABLET, FILM COATED ORAL EVERY 8 HOURS PRN
Status: DISCONTINUED | OUTPATIENT
Start: 2024-01-28 | End: 2024-01-28 | Stop reason: HOSPADM

## 2024-01-27 RX ORDER — SODIUM CHLORIDE, SODIUM LACTATE, POTASSIUM CHLORIDE, CALCIUM CHLORIDE 600; 310; 30; 20 MG/100ML; MG/100ML; MG/100ML; MG/100ML
INJECTION, SOLUTION INTRAVENOUS CONTINUOUS
Status: DISCONTINUED | OUTPATIENT
Start: 2024-01-28 | End: 2024-01-28

## 2024-01-27 RX ORDER — GLUCAGON 1 MG
1 KIT INJECTION
Status: DISCONTINUED | OUTPATIENT
Start: 2024-01-27 | End: 2024-01-28 | Stop reason: HOSPADM

## 2024-01-27 RX ORDER — SODIUM CHLORIDE 0.9 % (FLUSH) 0.9 %
10 SYRINGE (ML) INJECTION
Status: DISCONTINUED | OUTPATIENT
Start: 2024-01-27 | End: 2024-01-28 | Stop reason: HOSPADM

## 2024-01-27 RX ORDER — AMLODIPINE BESYLATE 5 MG/1
5 TABLET ORAL DAILY
Status: DISCONTINUED | OUTPATIENT
Start: 2024-01-28 | End: 2024-01-27

## 2024-01-27 RX ORDER — AMLODIPINE BESYLATE 5 MG/1
10 TABLET ORAL DAILY
Status: DISCONTINUED | OUTPATIENT
Start: 2024-01-27 | End: 2024-01-27

## 2024-01-27 RX ORDER — LANOLIN ALCOHOL/MO/W.PET/CERES
800 CREAM (GRAM) TOPICAL
Status: DISCONTINUED | OUTPATIENT
Start: 2024-01-27 | End: 2024-01-28 | Stop reason: HOSPADM

## 2024-01-27 RX ORDER — HYDRALAZINE HYDROCHLORIDE 20 MG/ML
5 INJECTION INTRAMUSCULAR; INTRAVENOUS
Status: COMPLETED | OUTPATIENT
Start: 2024-01-27 | End: 2024-01-27

## 2024-01-27 RX ORDER — SODIUM CHLORIDE 0.9 % (FLUSH) 0.9 %
10 SYRINGE (ML) INJECTION EVERY 8 HOURS
Status: DISCONTINUED | OUTPATIENT
Start: 2024-01-27 | End: 2024-01-27

## 2024-01-27 RX ORDER — TALC
6 POWDER (GRAM) TOPICAL NIGHTLY PRN
Status: DISCONTINUED | OUTPATIENT
Start: 2024-01-27 | End: 2024-01-28 | Stop reason: HOSPADM

## 2024-01-27 RX ORDER — AMLODIPINE BESYLATE 5 MG/1
10 TABLET ORAL DAILY
Status: DISCONTINUED | OUTPATIENT
Start: 2024-01-27 | End: 2024-01-28

## 2024-01-27 RX ORDER — INSULIN ASPART 100 [IU]/ML
0-5 INJECTION, SOLUTION INTRAVENOUS; SUBCUTANEOUS
Status: DISCONTINUED | OUTPATIENT
Start: 2024-01-27 | End: 2024-01-28 | Stop reason: HOSPADM

## 2024-01-27 RX ORDER — ACETAMINOPHEN 325 MG/1
650 TABLET ORAL EVERY 4 HOURS PRN
Status: DISCONTINUED | OUTPATIENT
Start: 2024-01-27 | End: 2024-01-28 | Stop reason: HOSPADM

## 2024-01-27 RX ORDER — IBUPROFEN 200 MG
24 TABLET ORAL
Status: DISCONTINUED | OUTPATIENT
Start: 2024-01-27 | End: 2024-01-28 | Stop reason: HOSPADM

## 2024-01-27 RX ORDER — LOSARTAN POTASSIUM 25 MG/1
25 TABLET ORAL DAILY
Status: DISCONTINUED | OUTPATIENT
Start: 2024-01-28 | End: 2024-01-28 | Stop reason: HOSPADM

## 2024-01-27 RX ORDER — AMOXICILLIN 250 MG
1 CAPSULE ORAL DAILY PRN
Status: DISCONTINUED | OUTPATIENT
Start: 2024-01-27 | End: 2024-01-28 | Stop reason: HOSPADM

## 2024-01-27 RX ADMIN — AMLODIPINE BESYLATE 10 MG: 5 TABLET ORAL at 11:01

## 2024-01-27 RX ADMIN — INSULIN HUMAN 6 UNITS: 100 INJECTION, SOLUTION PARENTERAL at 03:01

## 2024-01-27 RX ADMIN — HYDRALAZINE HYDROCHLORIDE 5 MG: 20 INJECTION INTRAMUSCULAR; INTRAVENOUS at 05:01

## 2024-01-27 RX ADMIN — SODIUM CHLORIDE, POTASSIUM CHLORIDE, SODIUM LACTATE AND CALCIUM CHLORIDE: 600; 310; 30; 20 INJECTION, SOLUTION INTRAVENOUS at 11:01

## 2024-01-27 RX ADMIN — INSULIN DETEMIR 8 UNITS: 100 INJECTION, SOLUTION SUBCUTANEOUS at 09:01

## 2024-01-27 RX ADMIN — SODIUM CHLORIDE 1000 ML: 9 INJECTION, SOLUTION INTRAVENOUS at 03:01

## 2024-01-27 RX ADMIN — INSULIN ASPART 1 UNITS: 100 INJECTION, SOLUTION INTRAVENOUS; SUBCUTANEOUS at 11:01

## 2024-01-27 RX ADMIN — SODIUM CHLORIDE 1000 ML: 9 INJECTION, SOLUTION INTRAVENOUS at 02:01

## 2024-01-27 NOTE — ADMISSIONCARE
AdmissionCare    Guideline: Diabetes - OBS, Observation    Based on the indications selected for the patient, the bed status of Admit to Observation was determined to be MET    The following indications were selected as present at the time of evaluation of the patient:      Hyperglycemia that requires care beyond emergency department treatment time frame (eg, 3 to 4 hours), as indicated by 1 or more of the following:   -     - Dehydration    - Electrolyte abnormality    - Ability to maintain oral hydration unclear    - Glucose level persistently too high for next level of care (eg, concern for redevelopment of dehydration, electrolyte abnormality), or not sufficiently stable    AdmissionCare documentation entered by: Melisa Hanks    [facility] [employer], 27th edition, Copyright © 2023 [facility] [employer], [facility] All Rights Reserved.  [id number]

## 2024-01-27 NOTE — ASSESSMENT & PLAN NOTE
Presents within initial BP of 189/100. Denies history of HTN.   Will start on amlodipine concern for new onset HTN with associated DM

## 2024-01-27 NOTE — ASSESSMENT & PLAN NOTE
Presents with polyuria and polydipsia and found to have BG of 480 on arrival. Concern for new onset DM  Will start 8 units basal sliding scale insulin, diabetic diet  Check A1c  DM teaching prior to discharge  Check lipids  Continue IVF given continued tachycardia

## 2024-01-27 NOTE — ED PROVIDER NOTES
Encounter Date: 1/27/2024    SCRIBE #1 NOTE: I, Bonnie Cunningham, am scribing for, and in the presence of,  Georgiana Hernandez MD. I have scribed the following portions of the note - Other sections scribed: HPI, ROS, PE.     History     Chief Complaint   Patient presents with    Dehydration    Eye Problem    Fatigue     A 42 y/o male presents to the ER c/o fatigue, visual disturbance, dehydration, dry mouth, and frequent urination x 1 month. Pt report family has Hx of DMII.      Boaz Pedroza is a 43 y.o. male, with no past medical history, who presents to the ED with dry mouth that began 2 weeks ago. Patient reports associated increased thirst, blurry vision, urinary frequency, and fatigue. Patient denies any associated syncope. Patient notes family history of DMII. Patient has allergy to penicillins. Patient admits to smoking tobacco and occasional marijuana usage.       The history is provided by the patient. No  was used.     Review of patient's allergies indicates:   Allergen Reactions    Pcn [penicillins] Anaphylaxis     No past medical history on file.  No past surgical history on file.  No family history on file.  Social History     Tobacco Use    Smoking status: Every Day     Current packs/day: 1.00     Types: Cigarettes    Smokeless tobacco: Never   Substance Use Topics    Alcohol use: Yes     Comment: sometimes    Drug use: Yes     Types: Marijuana     Review of Systems   Constitutional:  Positive for fatigue. Negative for activity change, appetite change, chills and fever.        (+) increased thirst    HENT:  Negative for congestion, rhinorrhea, sneezing and sore throat.         (+) dry mouth   Eyes:  Positive for visual disturbance (blurry vision).   Respiratory:  Negative for cough, chest tightness, shortness of breath and wheezing.    Cardiovascular:  Negative for chest pain and palpitations.   Gastrointestinal:  Negative for abdominal pain, diarrhea, nausea and vomiting.    Endocrine: Positive for polydipsia and polyuria.   Genitourinary:  Positive for frequency.   Skin:  Negative for rash.   Neurological:  Negative for dizziness, syncope, light-headedness and headaches.   All other systems reviewed and are negative.      Physical Exam     Initial Vitals [01/27/24 1259]   BP Pulse Resp Temp SpO2   (!) 157/108 (!) 118 18 98.4 °F (36.9 °C) 97 %      MAP       --         Physical Exam    Nursing note and vitals reviewed.  Constitutional: He appears well-developed and well-nourished. No distress.   HENT:   Head: Normocephalic and atraumatic.   Eyes: Conjunctivae are normal.   Neck:   Normal range of motion.  Cardiovascular:  Regular rhythm.   Tachycardia present.         No murmur heard.  Pulmonary/Chest: Effort normal and breath sounds normal. No respiratory distress.   Abdominal: Bowel sounds are normal. He exhibits no distension. There is no abdominal tenderness.   Musculoskeletal:         General: No tenderness or edema. Normal range of motion.      Cervical back: Normal range of motion.     Neurological: He is alert and oriented to person, place, and time.   Skin: Skin is warm and dry. No rash noted.   Psychiatric: He has a normal mood and affect. His behavior is normal.       ED Course   Procedures  Labs Reviewed   POCT URINALYSIS W/O SCOPE - Abnormal; Notable for the following components:       Result Value    Glucose, UA 3+ (*)     Ketones, UA 1+ (*)     Blood, UA Trace-intact (*)     Protein, UA 1+ (*)     All other components within normal limits   POCT GLUCOSE - Abnormal; Notable for the following components:    POCT Glucose 480 (*)     All other components within normal limits   ISTAT PROCEDURE - Abnormal; Notable for the following components:    POC PCO2 48.9 (*)     POC PO2 30 (*)     POC Lactate 2.44 (*)     All other components within normal limits   POCT CMP - Abnormal; Notable for the following components:    Calcium, POC 11.1 (*)     POC Creatinine 1.3 (*)     POC  Glucose 462 (*)     Protein, POC 8.6 (*)     All other components within normal limits   POCT GLUCOSE - Abnormal; Notable for the following components:    POCT Glucose 398 (*)     All other components within normal limits   POCT GLUCOSE - Abnormal; Notable for the following components:    POCT Glucose 338 (*)     All other components within normal limits   ISTAT PROCEDURE - Abnormal; Notable for the following components:    POC HCO3 22.4 (*)     POC BE -3 (*)     All other components within normal limits   POCT CMP - Abnormal; Notable for the following components:    POC Glucose 327 (*)     All other components within normal limits   HEMOGLOBIN A1C   POCT CBC   POCT GLUCOSE, HAND-HELD DEVICE   POCT URINALYSIS(INSTRUMENT)   POCT GLUCOSE, HAND-HELD DEVICE   POCT CMP   POCT CMP   POCT GLUCOSE MONITORING CONTINUOUS     EKG Readings: (Independently Interpreted)   Initial Reading: No STEMI. Rhythm: Sinus Tachycardia. Heart Rate: 107. Ectopy: No Ectopy. Conduction: Normal. ST Segments: Normal ST Segments. T Waves: Normal. Clinical Impression: Sinus Tachycardia Other Impression: independently interpreted by me       Imaging Results              X-Ray Chest AP Portable (Final result)  Result time 01/27/24 14:41:05      Final result by Yousuf Delgado MD (01/27/24 14:41:05)                   Impression:      No radiographic acute intrathoracic process seen.      Electronically signed by: Yousuf Delgado MD  Date:    01/27/2024  Time:    14:41               Narrative:    EXAMINATION:  XR CHEST AP PORTABLE    CLINICAL HISTORY:  DKA;    TECHNIQUE:  Single frontal view of the chest was performed.    COMPARISON:  None    FINDINGS:  The lungs are well expanded and clear, with normal appearance of pulmonary vasculature and no pleural effusion or pneumothorax.    The cardiac silhouette is normal in size. The hilar and mediastinal contours are unremarkable.    Bones are intact.                                       Medications    amLODIPine tablet 5 mg (has no administration in time range)   glucose chewable tablet 16 g (has no administration in time range)   glucose chewable tablet 24 g (has no administration in time range)   glucagon (human recombinant) injection 1 mg (has no administration in time range)   insulin detemir U-100 (Levemir) pen 8 Units (has no administration in time range)   insulin aspart U-100 pen 0-5 Units (has no administration in time range)   melatonin tablet 6 mg (has no administration in time range)   senna-docusate 8.6-50 mg per tablet 1 tablet (has no administration in time range)   acetaminophen tablet 650 mg (has no administration in time range)   naloxone 0.4 mg/mL injection 0.02 mg (has no administration in time range)   magnesium oxide tablet 800 mg (has no administration in time range)   magnesium oxide tablet 800 mg (has no administration in time range)   sodium chloride 0.9% flush 10 mL (has no administration in time range)   sodium chloride 0.9% bolus 1,000 mL 1,000 mL (0 mLs Intravenous Stopped 1/27/24 1656)   sodium chloride 0.9% bolus 1,000 mL 1,000 mL (0 mLs Intravenous Stopped 1/27/24 1656)   insulin regular injection 6 Units 0.06 mL (6 Units Intravenous Given 1/27/24 1527)   hydrALAZINE injection 5 mg (5 mg Intravenous Given 1/27/24 1708)     Medical Decision Making  43M presents with increased thirst, dry mouth, fatigue, urinary frequency, and blurry vision for 2 weeks. On exam, patient is tachycardic. In shared decision making with the patient I will order labs, imaging, and EKG. Work up shows hyperglycemia with new onset DM, dehydration, and elevated BP/HTN. PT was given 2L NS and 5u IV insulin. Glucose is still 338. BP improved with IV hydralazine. I feel pt needs further hydration and IV insulin. He has no insurance and no PCP, so he has no access to follow up. Will admit. I discussed admission with Dr. Hanks.    Amount and/or Complexity of Data Reviewed  Labs: ordered.  ECG/medicine tests:  ordered and independent interpretation performed. Decision-making details documented in ED Course.     Details: EKG independently interpreted by Dr. Hernandez, see above.     Risk  OTC drugs.  Prescription drug management.  Decision regarding hospitalization.  Diagnosis or treatment significantly limited by social determinants of health.    Critical Care  Total time providing critical care: 50 minutes          Scribe Attestation:   Scribe #1: I performed the above scribed service and the documentation accurately describes the services I performed. I attest to the accuracy of the note.                           I, Dr. Georgiana Hernandez, personally performed the services described in this documentation.   All medical record entries made by the scribe were at my direction and in my presence.   I have reviewed the chart and agree that the record is accurate and complete.   Georgiana Hernandez MD.  3:16 PM 01/27/2024     Clinical Impression:  Final diagnoses:  [E11.10] DKA (diabetic ketoacidoses)  [E11.10] DKA (diabetic ketoacidosis)  [R07.9] Chest pain  [E11.65] Hyperglycemia due to diabetes mellitus (Primary)  [I10] Hypertension, unspecified type          ED Disposition Condition    Observation Stable                Georgiana Hernandez MD  01/27/24 1802       Georgiana Hernandez MD  01/27/24 1802

## 2024-01-27 NOTE — SUBJECTIVE & OBJECTIVE
No past medical history on file.    No past surgical history on file.    Review of patient's allergies indicates:   Allergen Reactions    Pcn [penicillins] Anaphylaxis       No current facility-administered medications on file prior to encounter.     Current Outpatient Medications on File Prior to Encounter   Medication Sig    HYDROcodone-acetaminophen (NORCO) 5-325 mg per tablet Take 1 tablet by mouth every 6 (six) hours as needed.    mupirocin (BACTROBAN) 2 % ointment Apply topically 3 (three) times daily.    sulfamethoxazole-trimethoprim 800-160mg (BACTRIM DS) 800-160 mg Tab Take 1 tablet by mouth 2 (two) times daily.     Family History    None       Tobacco Use    Smoking status: Every Day     Current packs/day: 1.00     Types: Cigarettes    Smokeless tobacco: Never   Substance and Sexual Activity    Alcohol use: Yes     Comment: sometimes    Drug use: Yes     Types: Marijuana    Sexual activity: Yes     Partners: Female     Birth control/protection: Condom, None     Review of Systems   Constitutional:  Negative for chills and fever.   HENT:  Negative for nosebleeds and tinnitus.    Eyes:  Negative for photophobia and visual disturbance.   Respiratory:  Negative for shortness of breath and wheezing.    Cardiovascular:  Negative for chest pain, palpitations and leg swelling.   Gastrointestinal:  Negative for abdominal distention, nausea and vomiting.   Endocrine: Positive for polydipsia and polyuria. Negative for polyphagia.   Genitourinary:  Negative for dysuria, flank pain and hematuria.   Musculoskeletal:  Negative for gait problem and joint swelling.   Skin:  Negative for rash and wound.   Neurological:  Negative for seizures and syncope.     Objective:     Vital Signs (Most Recent):  Temp: 98.1 °F (36.7 °C) (01/27/24 1700)  Pulse: 103 (01/27/24 1719)  Resp: 16 (01/27/24 1700)  BP: (!) 157/97 (01/27/24 1719)  SpO2: 99 % (01/27/24 1719) Vital Signs (24h Range):  Temp:  [98.1 °F (36.7 °C)-98.4 °F (36.9 °C)]  98.1 °F (36.7 °C)  Pulse:  [] 103  Resp:  [16-18] 16  SpO2:  [97 %-100 %] 99 %  BP: (157-189)/() 157/97     Weight: 108.9 kg (240 lb)  Body mass index is 34.44 kg/m².     Physical Exam  Vitals and nursing note reviewed.   Constitutional:       General: He is not in acute distress.     Appearance: He is well-developed. He is not diaphoretic.   HENT:      Head: Normocephalic and atraumatic.      Right Ear: External ear normal.      Left Ear: External ear normal.   Eyes:      General:         Right eye: No discharge.         Left eye: No discharge.      Conjunctiva/sclera: Conjunctivae normal.   Neck:      Thyroid: No thyromegaly.   Cardiovascular:      Rate and Rhythm: Normal rate and regular rhythm.      Heart sounds: No murmur heard.  Pulmonary:      Effort: Pulmonary effort is normal. No respiratory distress.      Breath sounds: Normal breath sounds.   Abdominal:      General: Bowel sounds are normal. There is no distension.      Palpations: Abdomen is soft. There is no mass.      Tenderness: There is no abdominal tenderness.   Musculoskeletal:         General: No deformity.      Cervical back: Normal range of motion and neck supple.      Right lower leg: No edema.      Left lower leg: No edema.   Skin:     General: Skin is warm and dry.   Neurological:      Mental Status: He is alert and oriented to person, place, and time.      Sensory: No sensory deficit.   Psychiatric:         Mood and Affect: Mood normal.         Behavior: Behavior normal.                Significant Labs:   CBC  WBC: 13.8  H&H: 18&53  PLTs: 281    CMP  Sodium: 140  Potassium: 4.0  Chloride: 105  Bun: 14  Cr: 1.0   Alk Phos: 67  Albumin: 4.0  AST/ALT: 19/25  Glucose: 480  Lactic acid: 1.3  Calcium: 9.4  pH of 7.366    Significant Imaging:   Imaging Results              X-Ray Chest AP Portable (Final result)  Result time 01/27/24 14:41:05      Final result by Yousuf Delgado MD (01/27/24 14:41:05)                   Impression:       No radiographic acute intrathoracic process seen.      Electronically signed by: Yousuf Delgado MD  Date:    01/27/2024  Time:    14:41               Narrative:    EXAMINATION:  XR CHEST AP PORTABLE    CLINICAL HISTORY:  DKA;    TECHNIQUE:  Single frontal view of the chest was performed.    COMPARISON:  None    FINDINGS:  The lungs are well expanded and clear, with normal appearance of pulmonary vasculature and no pleural effusion or pneumothorax.    The cardiac silhouette is normal in size. The hilar and mediastinal contours are unremarkable.    Bones are intact.

## 2024-01-27 NOTE — HPI
Boaz Pedroza 43 y.o. male without PMHx presents to the hospital with a chief complaint of polyuria and fatigue.  He reports 1 month of increasing polyuria and polydipsia.  This was associated with feeling dry mouth.  He was concerned he was developing diabetes given his family history and presents to the emergency room.  He reports the symptoms have greatly improved with treatment emergency room.  He denies any previous medical history and takes no daily medications.  He denies fever nausea vomiting abdominal leg swelling melena hematuria hematemesis dizziness syncope.     In the ED, initial BG of 480, pH within normal limits, lactic acid negative, chest x-ray without acute process.

## 2024-01-28 VITALS
RESPIRATION RATE: 18 BRPM | DIASTOLIC BLOOD PRESSURE: 91 MMHG | WEIGHT: 242.5 LBS | BODY MASS INDEX: 34.72 KG/M2 | SYSTOLIC BLOOD PRESSURE: 141 MMHG | TEMPERATURE: 98 F | HEIGHT: 70 IN | HEART RATE: 99 BPM | OXYGEN SATURATION: 100 %

## 2024-01-28 LAB
ALBUMIN SERPL BCP-MCNC: 3.5 G/DL (ref 3.5–5.2)
ALP SERPL-CCNC: 68 U/L (ref 55–135)
ALT SERPL W/O P-5'-P-CCNC: 22 U/L (ref 10–44)
ANION GAP SERPL CALC-SCNC: 10 MMOL/L (ref 8–16)
AST SERPL-CCNC: 18 U/L (ref 10–40)
BASOPHILS # BLD AUTO: 0.14 K/UL (ref 0–0.2)
BASOPHILS NFR BLD: 1.3 % (ref 0–1.9)
BILIRUB SERPL-MCNC: 0.7 MG/DL (ref 0.1–1)
BUN SERPL-MCNC: 12 MG/DL (ref 6–20)
CALCIUM SERPL-MCNC: 8.5 MG/DL (ref 8.7–10.5)
CHLORIDE SERPL-SCNC: 103 MMOL/L (ref 95–110)
CHOLEST SERPL-MCNC: 171 MG/DL (ref 120–199)
CHOLEST/HDLC SERPL: 6.3 {RATIO} (ref 2–5)
CO2 SERPL-SCNC: 22 MMOL/L (ref 23–29)
CREAT SERPL-MCNC: 1.1 MG/DL (ref 0.5–1.4)
DIFFERENTIAL METHOD BLD: NORMAL
EOSINOPHIL # BLD AUTO: 0.3 K/UL (ref 0–0.5)
EOSINOPHIL NFR BLD: 3.1 % (ref 0–8)
ERYTHROCYTE [DISTWIDTH] IN BLOOD BY AUTOMATED COUNT: 12.2 % (ref 11.5–14.5)
EST. GFR  (NO RACE VARIABLE): >60 ML/MIN/1.73 M^2
ESTIMATED AVG GLUCOSE: 269 MG/DL (ref 68–131)
GLUCOSE SERPL-MCNC: 275 MG/DL (ref 70–110)
HBA1C MFR BLD: 11 % (ref 4–5.6)
HCT VFR BLD AUTO: 44.5 % (ref 40–54)
HDLC SERPL-MCNC: 27 MG/DL (ref 40–75)
HDLC SERPL: 15.8 % (ref 20–50)
HGB BLD-MCNC: 15 G/DL (ref 14–18)
IMM GRANULOCYTES # BLD AUTO: 0.03 K/UL (ref 0–0.04)
IMM GRANULOCYTES NFR BLD AUTO: 0.3 % (ref 0–0.5)
LDLC SERPL CALC-MCNC: ABNORMAL MG/DL (ref 63–159)
LYMPHOCYTES # BLD AUTO: 3.9 K/UL (ref 1–4.8)
LYMPHOCYTES NFR BLD: 35.2 % (ref 18–48)
MCH RBC QN AUTO: 29 PG (ref 27–31)
MCHC RBC AUTO-ENTMCNC: 33.7 G/DL (ref 32–36)
MCV RBC AUTO: 86 FL (ref 82–98)
MONOCYTES # BLD AUTO: 0.6 K/UL (ref 0.3–1)
MONOCYTES NFR BLD: 5.4 % (ref 4–15)
NEUTROPHILS # BLD AUTO: 6 K/UL (ref 1.8–7.7)
NEUTROPHILS NFR BLD: 54.7 % (ref 38–73)
NONHDLC SERPL-MCNC: 144 MG/DL
NRBC BLD-RTO: 0 /100 WBC
PLATELET # BLD AUTO: 249 K/UL (ref 150–450)
PMV BLD AUTO: 12 FL (ref 9.2–12.9)
POCT GLUCOSE: 254 MG/DL (ref 70–110)
POCT GLUCOSE: 264 MG/DL (ref 70–110)
POCT GLUCOSE: 296 MG/DL (ref 70–110)
POTASSIUM SERPL-SCNC: 3.9 MMOL/L (ref 3.5–5.1)
PROT SERPL-MCNC: 6.5 G/DL (ref 6–8.4)
RBC # BLD AUTO: 5.17 M/UL (ref 4.6–6.2)
SODIUM SERPL-SCNC: 135 MMOL/L (ref 136–145)
TRIGL SERPL-MCNC: 642 MG/DL (ref 30–150)
WBC # BLD AUTO: 11.04 K/UL (ref 3.9–12.7)

## 2024-01-28 PROCEDURE — 63600175 PHARM REV CODE 636 W HCPCS: Performed by: PHYSICIAN ASSISTANT

## 2024-01-28 PROCEDURE — 25000003 PHARM REV CODE 250: Performed by: PHYSICIAN ASSISTANT

## 2024-01-28 PROCEDURE — 25000003 PHARM REV CODE 250: Performed by: NURSE PRACTITIONER

## 2024-01-28 PROCEDURE — 96361 HYDRATE IV INFUSION ADD-ON: CPT

## 2024-01-28 PROCEDURE — 80061 LIPID PANEL: CPT | Performed by: PHYSICIAN ASSISTANT

## 2024-01-28 PROCEDURE — G0378 HOSPITAL OBSERVATION PER HR: HCPCS

## 2024-01-28 PROCEDURE — 85025 COMPLETE CBC W/AUTO DIFF WBC: CPT | Performed by: PHYSICIAN ASSISTANT

## 2024-01-28 PROCEDURE — 80053 COMPREHEN METABOLIC PANEL: CPT | Performed by: PHYSICIAN ASSISTANT

## 2024-01-28 PROCEDURE — 36415 COLL VENOUS BLD VENIPUNCTURE: CPT | Performed by: PHYSICIAN ASSISTANT

## 2024-01-28 RX ORDER — LOSARTAN POTASSIUM 25 MG/1
25 TABLET ORAL DAILY
Qty: 30 TABLET | Refills: 3 | Status: SHIPPED | OUTPATIENT
Start: 2024-01-29 | End: 2025-01-28

## 2024-01-28 RX ORDER — AMLODIPINE BESYLATE 5 MG/1
10 TABLET ORAL DAILY
Status: DISCONTINUED | OUTPATIENT
Start: 2024-01-28 | End: 2024-01-28 | Stop reason: HOSPADM

## 2024-01-28 RX ORDER — AMLODIPINE BESYLATE 10 MG/1
10 TABLET ORAL DAILY
Qty: 30 TABLET | Refills: 3 | Status: SHIPPED | OUTPATIENT
Start: 2024-01-28 | End: 2025-01-27

## 2024-01-28 RX ORDER — METFORMIN HYDROCHLORIDE 500 MG/1
500 TABLET ORAL 2 TIMES DAILY WITH MEALS
Qty: 60 TABLET | Refills: 3 | Status: SHIPPED | OUTPATIENT
Start: 2024-01-28 | End: 2025-01-27

## 2024-01-28 RX ORDER — LANCETS
1 EACH MISCELLANEOUS
Qty: 200 EACH | Refills: 1 | Status: SHIPPED | OUTPATIENT
Start: 2024-01-28

## 2024-01-28 RX ORDER — GLIMEPIRIDE 2 MG/1
2 TABLET ORAL
Qty: 90 TABLET | Refills: 3 | Status: SHIPPED | OUTPATIENT
Start: 2024-01-28 | End: 2025-01-27

## 2024-01-28 RX ORDER — INSULIN PUMP SYRINGE, 3 ML
EACH MISCELLANEOUS
Qty: 1 EACH | Refills: 0 | Status: SHIPPED | OUTPATIENT
Start: 2024-01-28 | End: 2025-01-27

## 2024-01-28 RX ADMIN — AMLODIPINE BESYLATE 10 MG: 5 TABLET ORAL at 11:01

## 2024-01-28 RX ADMIN — SODIUM CHLORIDE, POTASSIUM CHLORIDE, SODIUM LACTATE AND CALCIUM CHLORIDE: 600; 310; 30; 20 INJECTION, SOLUTION INTRAVENOUS at 07:01

## 2024-01-28 RX ADMIN — INSULIN ASPART 3 UNITS: 100 INJECTION, SOLUTION INTRAVENOUS; SUBCUTANEOUS at 12:01

## 2024-01-28 RX ADMIN — LOSARTAN POTASSIUM 25 MG: 25 TABLET, FILM COATED ORAL at 08:01

## 2024-01-28 RX ADMIN — INSULIN ASPART 3 UNITS: 100 INJECTION, SOLUTION INTRAVENOUS; SUBCUTANEOUS at 09:01

## 2024-01-28 NOTE — PLAN OF CARE
Patient is ready and clear for discharge from Case Management's perspective; informed patient's nurse, Mary. Discussed and provided patient with written discharge instruction and education.

## 2024-01-28 NOTE — CONSULTS
Patient to discharge home with family and follow-ups; no other discharge needs anticipated. See full Discharge Assessment for more comprehensive patient information.

## 2024-01-28 NOTE — ASSESSMENT & PLAN NOTE
Presents within initial BP of 189/100. Denies history of HTN.   Will start on amlodipine/losartan concern for new onset HTN with associated DM and BP persistently near 190

## 2024-01-28 NOTE — HOSPITAL COURSE
Admission to the hospital for new onset diabetes and hypertension.  Hyperglycemia 400 on admission.  Blood sugar improved with insulin.  Hemoglobin A1c 11.0.  Patient does not have insurance and not able to afford insulin.  Will start with metformin and  glimepiride.  Diabetic education provided by nurse.  Discussed also to log blood sugars. Patient stated he follow up with [facility] tomorrow.  Blood pressure improved with start of losartan and amlodipine.  Instruct patient to log blood pressure and bring to next PCP visit.  All findings and plan were explained to the patient. All questions and concerns were answered. Patient verbalized understanding. Patient is in stable condition to d/c home and has been informed to follow up with PCP.

## 2024-01-28 NOTE — DISCHARGE SUMMARY
[facility]  Discharge Summary      Patient Name: Boaz Pedroza  MRN: [id number]  KARLI: [id number]  Patient Class: OP- Observation  Admission Date: 1/27/2024  Hospital Length of Stay: 0 days  Discharge Date and Time:  01/28/2024 3:10 PM  Attending Physician: García Teran MD   Discharging Provider: Renetta Alarcon NP  Primary Care Provider: [facility] -    Primary Care Team: RENETTA ALARCON    HPI:   Boaz Pedroza 43 y.o. male without PMHx presents to the hospital with a chief complaint of polyuria and fatigue.  He reports 1 month of increasing polyuria and polydipsia.  This was associated with feeling dry mouth.  He was concerned he was developing diabetes given his family history and presents to the emergency room.  He reports the symptoms have greatly improved with treatment emergency room.  He denies any previous medical history and takes no daily medications.  He denies fever nausea vomiting abdominal leg swelling melena hematuria hematemesis dizziness syncope.     In the ED, initial BG of 480, pH within normal limits, lactic acid negative, chest x-ray without acute process.     * No surgery found *      Hospital Course:   Admission to the hospital for new onset diabetes and hypertension.  Hyperglycemia 400 on admission.  Blood sugar improved with insulin.  Hemoglobin A1c 11.0.  Patient does not have insurance and not able to afford insulin.  Will start with metformin and  glimepiride.  Diabetic education provided by nurse.  Discussed also to log blood sugars. Patient stated he follow up with [facility] tomorrow.  Blood pressure improved with start of losartan and amlodipine.  Instruct patient to log blood pressure and bring to next PCP visit.  All findings and plan were explained to the patient. All questions and concerns were answered. Patient verbalized understanding. Patient is in stable condition to d/c home and has been informed to follow up with PCP.     Ambulatory refer to [facility].     Goals of Care Treatment Preferences:  Code Status: Full Code      Consults:   Consults (From admission, onward)          Status Ordering Provider     Inpatient consult to Registered Dietitian/Nutritionist  Once        Provider:  (Not yet assigned)    JAYLA Brennan     Case Management/  Once        Provider:  (Not yet assigned)    Completed JAYLA MCNAIR              Final Active Diagnoses:    Diagnosis Date Noted POA    PRINCIPAL PROBLEM:  Hyperglycemia [R73.9] 01/27/2024 Yes    Elevated blood pressure reading without diagnosis of hypertension [R03.0] 01/27/2024 Yes      Problems Resolved During this Admission:       Discharged Condition: good    Disposition: Home or Self Care    Follow Up:   Follow-up Information       [facility] -. Schedule an appointment as soon as possible for a visit in 1 week(s).    Why: [facility] Follow-ups and to establish on-going medical care.  Contact information:  [address] [facility] [address] [id number]  [phone]                           Patient Instructions:      Ambulatory referral/consult to [facility] - Medical & Palliative   Standing Status: Future   Referral Priority: Routine Referral Type: Consultation   Referral Reason: Specialty Services Required   Number of Visits Requested: 1     Diet Cardiac     Diet diabetic     Notify your health care provider if you experience any of the following:  temperature >100.4     Notify your health care provider if you experience any of the following:  persistent dizziness, light-headedness, or visual disturbances     Notify your health care provider if you experience any of the following:  increased confusion or weakness     Activity as tolerated       Significant Diagnostic Studies: N/A    Pending Diagnostic Studies:       None           Medications:  Reconciled Home Medications:      Medication  List        START taking these medications      amLODIPine 10 MG tablet  Commonly known as: NORVASC  Take 1 tablet (10 mg total) by mouth once daily.     blood sugar diagnostic Strp  1 each by Misc.(Non-Drug; Combo Route) route 4 (four) times daily with meals and nightly.     * blood-glucose meter kit  Use as instructed     * blood-glucose meter kit  Use as instructed     glimepiride 2 MG tablet  Commonly known as: AMARYL  Take 1 tablet (2 mg total) by mouth before breakfast.     lancets Misc  1 Applicatorful by Misc.(Non-Drug; Combo Route) route 4 (four) times daily with meals and nightly.     losartan 25 MG tablet  Commonly known as: COZAAR  Take 1 tablet (25 mg total) by mouth once daily.  Start taking on: January 29, 2024     metFORMIN 500 MG tablet  Commonly known as: GLUCOPHAGE  Take 1 tablet (500 mg total) by mouth 2 (two) times daily with meals.           * This list has 2 medication(s) that are the same as other medications prescribed for you. Read the directions carefully, and ask your doctor or other care provider to review them with you.                CONTINUE taking these medications      mupirocin 2 % ointment  Commonly known as: BACTROBAN  Apply topically 3 (three) times daily.            STOP taking these medications      HYDROcodone-acetaminophen 5-325 mg per tablet  Commonly known as: NORCO     sulfamethoxazole-trimethoprim 800-160mg 800-160 mg Tab  Commonly known as: BACTRIM DS              Indwelling Lines/Drains at time of discharge:   Lines/Drains/Airways       None                   Time spent on the discharge of patient: 35 minutes         Renetta Zheng NP  Department of Hospital Medicine  [facility]

## 2024-01-28 NOTE — NURSING
[facility], [facility] [place]  Nurses Note -- 4 Eyes      1/28/2024       Skin assessed on: Q Shift      [x] No Pressure Injuries Present    []Prevention Measures Documented    [] Yes LDA  for Pressure Injury Previously documented     [] Yes New Pressure Injury Discovered   [] LDA for New Pressure Injury Added      Attending :  Mary Sanders LPN     Second RN:  Teresita Ballard RN

## 2024-01-28 NOTE — PLAN OF CARE
01/28/24 1205   Final Note   Assessment Type Final Discharge Note   Anticipated Discharge Disposition Home  (Follow-ups)   What phone number can be called within the next 1-3 days to see how you are doing after discharge?   ([phone])   Hospital Resources/Appts/Education Provided Provided patient/caregiver with written discharge plan information;Provided education on problems/symptoms using teachback;Appointments scheduled and added to AVS;Post-Acute resouces added to AVS   Post-Acute Status   Post-Acute Authorization Other  (Home; follow-ups)   Discharge Delays None known at this time

## 2024-01-28 NOTE — NURSING
[facility], [facility]  Nurses Note -- 4 Eyes      1/27/2024       Skin assessed on: Admit      [x] No Pressure Injuries Present    []Prevention Measures Documented    [] Yes LDA  for Pressure Injury Previously documented     [] Yes New Pressure Injury Discovered   [] LDA for New Pressure Injury Added      Attending RN:  Donna Pereyra RN     Second RN:  Apurva SANCHEZ

## 2024-01-28 NOTE — H&P
[facility]  History & Physical    Patient Name: Boaz Pedroza  MRN: [id number]  Patient Class: OP- Observation  Admission Date: 1/27/2024  Attending Physician: García Teran MD   Primary Care Provider: [facility]         Patient information was obtained from patient, past medical records, and ER records.     Subjective:     Principal Problem:Hyperglycemia    Chief Complaint:   Chief Complaint   Patient presents with    Dehydration    Eye Problem    Fatigue     A 42 y/o male presents to the ER c/o fatigue, visual disturbance, dehydration, dry mouth, and frequent urination x 1 month. Pt report family has Hx of DMII.         HPI: Boaz Pedroza 43 y.o. male without PMHx presents to the hospital with a chief complaint of polyuria and fatigue.  He reports 1 month of increasing polyuria and polydipsia.  This was associated with feeling dry mouth.  He was concerned he was developing diabetes given his family history and presents to the emergency room.  He reports the symptoms have greatly improved with treatment emergency room.  He denies any previous medical history and takes no daily medications.  He denies fever nausea vomiting abdominal leg swelling melena hematuria hematemesis dizziness syncope.     In the ED, initial BG of 480, pH within normal limits, lactic acid negative, chest x-ray without acute process.     No past medical history on file.    No past surgical history on file.    Review of patient's allergies indicates:   Allergen Reactions    Pcn [penicillins] Anaphylaxis       No current facility-administered medications on file prior to encounter.     Current Outpatient Medications on File Prior to Encounter   Medication Sig    HYDROcodone-acetaminophen (NORCO) 5-325 mg per tablet Take 1 tablet by mouth every 6 (six) hours as needed.    mupirocin (BACTROBAN) 2 % ointment Apply topically 3 (three) times daily.    sulfamethoxazole-trimethoprim 800-160mg (BACTRIM DS) 800-160  mg Tab Take 1 tablet by mouth 2 (two) times daily.     Family History    None       Tobacco Use    Smoking status: Every Day     Current packs/day: 1.00     Types: Cigarettes    Smokeless tobacco: Never   Substance and Sexual Activity    Alcohol use: Yes     Comment: sometimes    Drug use: Yes     Types: Marijuana    Sexual activity: Yes     Partners: Female     Birth control/protection: Condom, None     Review of Systems   Constitutional:  Negative for chills and fever.   HENT:  Negative for nosebleeds and tinnitus.    Eyes:  Negative for photophobia and visual disturbance.   Respiratory:  Negative for shortness of breath and wheezing.    Cardiovascular:  Negative for chest pain, palpitations and leg swelling.   Gastrointestinal:  Negative for abdominal distention, nausea and vomiting.   Endocrine: Positive for polydipsia and polyuria. Negative for polyphagia.   Genitourinary:  Negative for dysuria, flank pain and hematuria.   Musculoskeletal:  Negative for gait problem and joint swelling.   Skin:  Negative for rash and wound.   Neurological:  Negative for seizures and syncope.     Objective:     Vital Signs (Most Recent):  Temp: 98.1 °F (36.7 °C) (01/27/24 1700)  Pulse: 103 (01/27/24 1719)  Resp: 16 (01/27/24 1700)  BP: (!) 157/97 (01/27/24 1719)  SpO2: 99 % (01/27/24 1719) Vital Signs (24h Range):  Temp:  [98.1 °F (36.7 °C)-98.4 °F (36.9 °C)] 98.1 °F (36.7 °C)  Pulse:  [] 103  Resp:  [16-18] 16  SpO2:  [97 %-100 %] 99 %  BP: (157-189)/() 157/97     Weight: 108.9 kg (240 lb)  Body mass index is 34.44 kg/m².     Physical Exam  Vitals and nursing note reviewed.   Constitutional:       General: He is not in acute distress.     Appearance: He is well-developed. He is not diaphoretic.   HENT:      Head: Normocephalic and atraumatic.      Right Ear: External ear normal.      Left Ear: External ear normal.   Eyes:      General:         Right eye: No discharge.         Left eye: No discharge.       Conjunctiva/sclera: Conjunctivae normal.   Neck:      Thyroid: No thyromegaly.   Cardiovascular:      Rate and Rhythm: Normal rate and regular rhythm.      Heart sounds: No murmur heard.  Pulmonary:      Effort: Pulmonary effort is normal. No respiratory distress.      Breath sounds: Normal breath sounds.   Abdominal:      General: Bowel sounds are normal. There is no distension.      Palpations: Abdomen is soft. There is no mass.      Tenderness: There is no abdominal tenderness.   Musculoskeletal:         General: No deformity.      Cervical back: Normal range of motion and neck supple.      Right lower leg: No edema.      Left lower leg: No edema.   Skin:     General: Skin is warm and dry.   Neurological:      Mental Status: He is alert and oriented to person, place, and time.      Sensory: No sensory deficit.   Psychiatric:         Mood and Affect: Mood normal.         Behavior: Behavior normal.                Significant Labs:   CBC  WBC: 13.8  H&H: 18&53  PLTs: 281    CMP  Sodium: 140  Potassium: 4.0  Chloride: 105  Bun: 14  Cr: 1.0   Alk Phos: 67  Albumin: 4.0  AST/ALT: 19/25  Glucose: 480  Lactic acid: 1.3  Calcium: 9.4  pH of 7.366    Significant Imaging:   Imaging Results              X-Ray Chest AP Portable (Final result)  Result time 01/27/24 14:41:05      Final result by Yousuf Delgado MD (01/27/24 14:41:05)                   Impression:      No radiographic acute intrathoracic process seen.      Electronically signed by: Yousuf Delgado MD  Date:    01/27/2024  Time:    14:41               Narrative:    EXAMINATION:  XR CHEST AP PORTABLE    CLINICAL HISTORY:  DKA;    TECHNIQUE:  Single frontal view of the chest was performed.    COMPARISON:  None    FINDINGS:  The lungs are well expanded and clear, with normal appearance of pulmonary vasculature and no pleural effusion or pneumothorax.    The cardiac silhouette is normal in size. The hilar and mediastinal contours are unremarkable.    Bones are  intact.                                      Assessment/Plan:     * Hyperglycemia  Presents with polyuria and polydipsia and found to have BG of 480 on arrival. Concern for new onset DM  Will start 8 units basal sliding scale insulin, diabetic diet  Check A1c  DM teaching prior to discharge  Check lipids  Continue IVF given continued tachycardia    Elevated blood pressure reading without diagnosis of hypertension  Presents within initial BP of 189/100. Denies history of HTN.   Will start on amlodipine/losartan concern for new onset HTN with associated DM and BP persistently near 190      VTE Risk Mitigation (From admission, onward)           Ordered     Place DIONE hose  Until discontinued         01/27/24 1747     IP VTE HIGH RISK PATIENT  Once         01/27/24 1747     Place sequential compression device  Until discontinued         01/27/24 1747                  Discussed with ED physician.    VTE: DIONE/SCD  Code: full  Diet: clear liquid, sugar free  Dispo: pending A1c and BG control  On 01/27/2024, patient should be placed in hospital observation services under my care in collaboration with García Teran MD.      AdmissionCare    Guideline: Diabetes - OBS, Observation    Based on the indications selected for the patient, the bed status of Admit to Observation was determined to be MET    The following indications were selected as present at the time of evaluation of the patient:      Hyperglycemia that requires care beyond emergency department treatment time frame (eg, 3 to 4 hours), as indicated by 1 or more of the following:   -     - Dehydration    - Electrolyte abnormality    - Ability to maintain oral hydration unclear    - Glucose level persistently too high for next level of care (eg, concern for redevelopment of dehydration, electrolyte abnormality), or not sufficiently stable    AdmissionCare documentation entered by: Melisa Hanks    [facility] [employer], 27th edition, Copyright © 2023 [facility] All Rights  Reserved.  [id number]    Henry Triplett PA-C  Department of [facility]  [facility]

## 2024-01-28 NOTE — PLAN OF CARE
Case Management Final Discharge Note      Discharge Disposition: Home; follow-ups  New DME ordered/Company name: None  Relevant SDOH/Transition of Care Barriers: None  Primary Caretaker and contact information: Venecia [phone]  Scheduled followup appointment: Entered into AVS  Referrals placed: None  Transportation: Family    Patient and family educated on discharge services and updated on DC plan. Bedside RN notified, patient clear to discharge from Case Management Perspective.

## 2024-01-28 NOTE — PLAN OF CARE
Case Management Assessment     PCP: [facility]  Pharmacy: [facility] [place], [place]  Patient Arrived From: Home  Existing Help at Home: Sana  Barriers to Discharge: None    Discharge Plan:    A. Home with family; follow-ups   B. Home with family; follow-ups     Independent at home with SO, who assists if needed; no assist needed; no current medical services or equipment; will connect with [facility] for medical care.        01/28/24 0831   Discharge Assessment   Assessment Type Discharge Planning Assessment   Confirmed/corrected address, phone number and insurance Yes   Confirmed Demographics Correct on Facesheet   Source of Information patient;health record   Does patient/caregiver understand observation status Yes   Communicated GRIFFIN with patient/caregiver Date not available/Unable to determine   Reason For Admission DKA   People in Home significant other   Facility Arrived From: Home   Do you expect to return to your current living situation? Yes   Do you have help at home or someone to help you manage your care at home? Yes   Who are your caregiver(s) and their phone number(s)? [facility]; [phone]   Prior to hospitilization cognitive status: Alert/Oriented   Current cognitive status: Alert/Oriented   Walking or Climbing Stairs Difficulty no   Dressing/Bathing Difficulty no   Home Accessibility wheelchair accessible   Home Layout Able to live on 1st floor   Equipment Currently Used at Home none   Readmission within 30 days? No   Patient currently being followed by outpatient case management? No   Do you take prescription medications? Yes   Do you have prescription coverage? No   Do you have any problems affording any of your prescribed medications? TBD   Is the patient taking medications as prescribed? yes   Who is going to help you get home at discharge? [facility]   How do you get to doctors appointments? car, drives self   Are you on dialysis? No   Do you take coumadin? No   Discharge Plan A Home with  family  (Follow-ups)   Discharge Plan B Home with family  (Follow-ups)   Discharge Plan discussed with: Patient   Transition of Care Barriers None     SW Role explained to patient; two patient identifiers recognized; SW contact information placed on Communication board. Discussed patient managing health care at home and discussed discharge plans A and B; determined who would be helping patient at home with recovery: Sana will help with recovery at home

## 2024-01-28 NOTE — PLAN OF CARE
01/28/24 0837   Post-Acute Status   Post-Acute Authorization Other  (Home with family; follow-up and establish with )   Other Status No Post-Acute Service Needs   Hospital Resources/Appts/Education Provided Provided patient/caregiver with written discharge plan information;Provided education on problems/symptoms using teachback;Appointments scheduled and added to AVS;Patient refused appointment set-up   Discharge Delays None known at this time   Discharge Plan   Discharge Plan A Home with family  (Follow-up and establish with )   Discharge Plan B Home with family  (Follow-up and establish with )

## 2024-03-05 ENCOUNTER — OCCUPATIONAL HEALTH (OUTPATIENT)
Dept: URGENT CARE | Facility: CLINIC | Age: 44
End: 2024-03-05

## 2024-03-05 DIAGNOSIS — Z02.1 PRE-EMPLOYMENT EXAMINATION: Primary | ICD-10-CM

## 2024-03-05 LAB
CTP QC/QA: YES
POC 5 PANEL DRUG SCREEN: NEGATIVE

## 2024-03-05 PROCEDURE — 80305 DRUG TEST PRSMV DIR OPT OBS: CPT | Mod: S$GLB,,, | Performed by: EMERGENCY MEDICINE

## 2024-03-05 PROCEDURE — 99499 UNLISTED E&M SERVICE: CPT | Mod: S$GLB,,, | Performed by: EMERGENCY MEDICINE
